# Patient Record
Sex: FEMALE | Race: BLACK OR AFRICAN AMERICAN | NOT HISPANIC OR LATINO | Employment: FULL TIME | URBAN - METROPOLITAN AREA
[De-identification: names, ages, dates, MRNs, and addresses within clinical notes are randomized per-mention and may not be internally consistent; named-entity substitution may affect disease eponyms.]

---

## 2018-05-15 ENCOUNTER — TELEPHONE (OUTPATIENT)
Dept: INTERNAL MEDICINE | Facility: CLINIC | Age: 48
End: 2018-05-15

## 2018-05-16 ENCOUNTER — HOSPITAL ENCOUNTER (OUTPATIENT)
Dept: GENERAL RADIOLOGY | Facility: HOSPITAL | Age: 48
Discharge: HOME | End: 2018-05-16
Admitting: FAMILY MEDICINE

## 2018-05-16 ENCOUNTER — OFFICE VISIT (OUTPATIENT)
Dept: INTERNAL MEDICINE | Facility: CLINIC | Age: 48
End: 2018-05-16

## 2018-05-16 VITALS
TEMPERATURE: 98.3 F | SYSTOLIC BLOOD PRESSURE: 124 MMHG | HEIGHT: 70 IN | DIASTOLIC BLOOD PRESSURE: 82 MMHG | OXYGEN SATURATION: 98 % | HEART RATE: 73 BPM

## 2018-05-16 DIAGNOSIS — M25.531 WRIST PAIN, ACUTE, RIGHT: ICD-10-CM
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
## 2018-05-16 DIAGNOSIS — K59.00 CONSTIPATION, UNSPECIFIED CONSTIPATION TYPE: ICD-10-CM

## 2018-05-16 DIAGNOSIS — I63.9 CEREBROVASCULAR ACCIDENT (CVA), UNSPECIFIED MECHANISM (HCC): Primary | ICD-10-CM

## 2018-05-16 PROCEDURE — 99204 OFFICE O/P NEW MOD 45 MIN: CPT | Performed by: FAMILY MEDICINE

## 2018-05-16 PROCEDURE — 73110 X-RAY EXAM OF WRIST: CPT

## 2018-05-16 RX ORDER — ASPIRIN 81 MG/1
81 TABLET ORAL DAILY
COMMUNITY
End: 2018-06-13 | Stop reason: SDUPTHER

## 2018-05-16 RX ORDER — CASTOR OIL AND BALSAM, PERU 788; 87 MG/G; MG/G
OINTMENT TOPICAL 2 TIMES DAILY
COMMUNITY
End: 2019-11-22

## 2018-05-16 RX ORDER — SODIUM BICARBONATE 650 MG/1
650 TABLET ORAL 3 TIMES DAILY
COMMUNITY
End: 2018-06-13 | Stop reason: SDUPTHER

## 2018-05-16 RX ORDER — TERAZOSIN 5 MG/1
5 CAPSULE ORAL NIGHTLY
COMMUNITY
End: 2018-06-13 | Stop reason: SDUPTHER

## 2018-05-16 RX ORDER — ATORVASTATIN CALCIUM 40 MG/1
40 TABLET, FILM COATED ORAL DAILY
COMMUNITY
End: 2018-06-13 | Stop reason: SDUPTHER

## 2018-05-16 RX ORDER — CALCITRIOL 0.25 UG/1
0.25 CAPSULE, LIQUID FILLED ORAL EVERY OTHER DAY
COMMUNITY
End: 2018-06-13 | Stop reason: SDUPTHER

## 2018-05-16 RX ORDER — NIFEDIPINE 90 MG/1
90 TABLET, EXTENDED RELEASE ORAL DAILY
COMMUNITY
End: 2018-06-13 | Stop reason: SDUPTHER

## 2018-05-16 RX ORDER — FLUOXETINE HYDROCHLORIDE 20 MG/1
20 CAPSULE ORAL DAILY
COMMUNITY
End: 2018-06-13 | Stop reason: SDUPTHER

## 2018-05-16 RX ORDER — ERGOCALCIFEROL 1.25 MG/1
50000 CAPSULE ORAL WEEKLY
COMMUNITY
End: 2018-06-13 | Stop reason: SDUPTHER

## 2018-05-16 RX ORDER — ISOSORBIDE DINITRATE 10 MG/1
10 TABLET ORAL 3 TIMES DAILY
COMMUNITY
End: 2018-06-13 | Stop reason: SDUPTHER

## 2018-05-16 RX ORDER — LABETALOL 200 MG/1
200 TABLET, FILM COATED ORAL 2 TIMES DAILY
COMMUNITY
End: 2018-06-13 | Stop reason: SDUPTHER

## 2018-06-05 ENCOUNTER — TELEPHONE (OUTPATIENT)
Dept: INTERNAL MEDICINE | Facility: CLINIC | Age: 48
End: 2018-06-05

## 2018-06-05 DIAGNOSIS — I10 HYPERTENSION, UNSPECIFIED TYPE: ICD-10-CM

## 2018-06-05 DIAGNOSIS — I63.9 CEREBROVASCULAR ACCIDENT (CVA), UNSPECIFIED MECHANISM (HCC): Primary | ICD-10-CM

## 2018-06-05 NOTE — TELEPHONE ENCOUNTER
Referrals needed:      1.[facility] for SN to discharge the patient, she's been having BP issues.  2.  [facility] for Speech, OT and PT.  Fax [phone] ATTN:  Jessica

## 2018-06-08 PROBLEM — I10 HYPERTENSION: Status: ACTIVE | Noted: 2018-06-08

## 2018-06-11 NOTE — PROGRESS NOTES
Subjective   Cindy Moreno is a 47 y.o. female.     Chief Complaint   Patient presents with   • Establish Care   • Vomiting     SX 2 days       History of Present Illness     Previous primary care: None    47-year-old female with a complex recent past medical history presents today to establish care.  She was, as of 6 months ago, previously healthy as far as she knew.  She sustained a CVA in the parking lot of [facility] with a protracted hospital course and stay at [facility].  We are in the process of obtaining records.  She presents today to establish care.    She also presents today with symptoms of vomiting for the past couple of days.  She has not been on her usual bowel regimen as her grandmother who is her caregiver here in [place] was unsure whether to have this.  It is nonprojectile and nonbilious.  It does not appear to be worsening over the past couple of days.    Her grandmother also remarks on a fall that she sustained a couple of days ago.  She reports swelling in her hand.  There is no numbness or tingling, she does have some weakness in that hand already from the stroke, but it does seem a little worse than usual following the fall.    #1 hypertension: Stable on nifedipine XL, labetalol 200 mg, Isordil 10 mg  #2 history of stroke: Stable on aspirin 81 mg, atorvastatin 40 mg  #3 depression: Stable on fluoxetine 20 mg    The following portions of the patient's history were reviewed and updated as appropriate: allergies, current medications, past family history, past medical history, past social history, past surgical history and problem list.    Active Ambulatory Problems     Diagnosis Date Noted   • Stroke 05/16/2018   • Hypertension 06/08/2018     Resolved Ambulatory Problems     Diagnosis Date Noted   • No Resolved Ambulatory Problems     Past Medical History:   Diagnosis Date   • Hyperlipidemia    • Hypertension    • Stroke      Past Surgical History:   Procedure Laterality Date  "  • TONSILLECTOMY       Family History   Problem Relation Age of Onset   • No Known Problems Mother    • Heart attack Father    • No Known Problems Sister    • Heart disease Brother    • No Known Problems Daughter    • No Known Problems Son      Social History     Social History   • Marital status: Unknown     Spouse name: N/A   • Number of children: N/A   • Years of education: N/A     Occupational History   • Not on file.     Social History Main Topics   • Smoking status: Never Smoker   • Smokeless tobacco: Never Used   • Alcohol use Yes      Comment: occasional   • Drug use: No   • Sexual activity: Not on file     Other Topics Concern   • Not on file     Social History Narrative   • No narrative on file         Review of Systems   Eyes: Negative.    Respiratory: Negative for cough, shortness of breath and wheezing.    Cardiovascular: Negative for chest pain and palpitations.   Gastrointestinal: Negative.    Endocrine: Negative for cold intolerance and heat intolerance.   Genitourinary: Negative for difficulty urinating, dysuria and frequency.   Musculoskeletal: Positive for joint swelling. Negative for neck stiffness.        Hand pain   Skin: Negative for color change and wound.   Neurological: Positive for speech difficulty, weakness and headaches. Negative for dizziness, tremors and seizures.   Psychiatric/Behavioral: Positive for confusion, dysphoric mood and sleep disturbance. Negative for agitation.       Objective   Blood pressure 124/82, pulse 73, temperature 98.3 °F (36.8 °C), temperature source Temporal Artery , height 177.8 cm (70\"), SpO2 98 %, not currently breastfeeding.  Nursing note reviewed  Physical Exam  Const: NAD, A&Ox4, Pleasant, Cooperative  Eyes: EOMI, no conjunctivitis  ENT: No nasal discharge present, neck supple  Cardiac: Regular rate and rhythm, no peripheral edema or cyanosis  Resp: Respiratory rate within normal limits, no increased work of breathing, no audible wheezing or retractions " noted  GI: No distention or ascites  MSK: Strength is 4/5 in the right hand.  This is slightly diminished from the 4+/5 otherwise in the right upper extremity.  There is swelling on the dorsum of the right hand.  Neurologic, CN II-XII grossly intact  Psych: Appropriate mood and behavior.  Skin: Pink, warm, dry  Procedures  Assessment/Plan   Cindy was seen today for establish care and vomiting.    Diagnoses and all orders for this visit:    Cerebrovascular accident (CVA), unspecified mechanism    Fall, initial encounter  -     XR Wrist 3+ View Right    Wrist pain, acute, right  -     XR Wrist 3+ View Right    Constipation, unspecified constipation type  -     docusate sodium (COLACE) 50 MG capsule; Take 1 capsule by mouth 2 (Two) Times a Day As Needed for Constipation.    Other orders  -     Cancel: Ambulatory Referral to Home Health      #1 right wrist pain secondary to fall  We will get an x-ray of the right wrist today.  She may have Tylenol for pain.  She was encouraged to ice the area throughout the day.    #2 constipation  We'll start her on docusate 50 mg capsule twice a day.    #3 history of CVA, weakness  She has orders for home health in from [facility].  This was confirmed personally over the phone with her  at [facility]    #4 hypertension  Stable on her medications today, blood pressure 124/82  Would like to have home health check this at least once a week    We will plan to obtain previous records both for chronic preventative care as well as those related to the current episode of care.  Any records that the patient brought with him today were reviewed personally by me during the visit today and will be scanned into the chart for posterity.    There are no Patient Instructions on file for this visit.    Current Outpatient Prescriptions:   •  aspirin 81 MG EC tablet, Take 81 mg by mouth Daily., Disp: , Rfl:   •  atorvastatin (LIPITOR) 40 MG tablet, Take 40 mg by mouth  Daily., Disp: , Rfl:   •  calcitriol (ROCALTROL) 0.25 MCG capsule, Take 0.25 mcg by mouth Every Other Day., Disp: , Rfl:   •  castor oil-balsam peru (VENELEX) ointment, Apply  topically 2 (Two) Times a Day., Disp: , Rfl:   •  FLUoxetine (PROzac) 20 MG capsule, Take 20 mg by mouth Daily., Disp: , Rfl:   •  isosorbide dinitrate (ISORDIL) 10 MG tablet, Take 10 mg by mouth 3 (Three) Times a Day., Disp: , Rfl:   •  labetalol (NORMODYNE) 200 MG tablet, Take 200 mg by mouth 2 (Two) Times a Day., Disp: , Rfl:   •  NIFEdipine XL (PROCARDIA XL) 90 MG 24 hr tablet, Take 90 mg by mouth Daily., Disp: , Rfl:   •  Patiromer Sorbitex Calcium 8.4 g pack, Take  by mouth., Disp: , Rfl:   •  sodium bicarbonate 650 MG tablet, Take 650 mg by mouth 3 (Three) Times a Day., Disp: , Rfl:   •  terazosin (HYTRIN) 5 MG capsule, Take 5 mg by mouth Every Night., Disp: , Rfl:   •  vitamin D (ERGOCALCIFEROL) 42490 units capsule capsule, Take 50,000 Units by mouth 1 (One) Time Per Week., Disp: , Rfl:   •  docusate sodium (COLACE) 50 MG capsule, Take 1 capsule by mouth 2 (Two) Times a Day As Needed for Constipation., Disp: 60 capsule, Rfl: 2    No Known Allergies      There is no immunization history on file for this patient.    Ambulatory progress note signed and attested to by To Bennett D.O.

## 2018-06-13 ENCOUNTER — OFFICE VISIT (OUTPATIENT)
Dept: INTERNAL MEDICINE | Facility: CLINIC | Age: 48
End: 2018-06-13

## 2018-06-13 VITALS
SYSTOLIC BLOOD PRESSURE: 124 MMHG | OXYGEN SATURATION: 98 % | WEIGHT: 146 LBS | DIASTOLIC BLOOD PRESSURE: 84 MMHG | HEART RATE: 71 BPM | HEIGHT: 70 IN | TEMPERATURE: 97.8 F | BODY MASS INDEX: 20.9 KG/M2

## 2018-06-13 DIAGNOSIS — I10 HYPERTENSION, UNSPECIFIED TYPE: Primary | ICD-10-CM

## 2018-06-13 DIAGNOSIS — H10.32 ACUTE CONJUNCTIVITIS OF LEFT EYE, UNSPECIFIED ACUTE CONJUNCTIVITIS TYPE: ICD-10-CM

## 2018-06-13 DIAGNOSIS — K59.00 CONSTIPATION, UNSPECIFIED CONSTIPATION TYPE: ICD-10-CM

## 2018-06-13 PROCEDURE — 99213 OFFICE O/P EST LOW 20 MIN: CPT | Performed by: FAMILY MEDICINE

## 2018-06-13 RX ORDER — NIFEDIPINE 90 MG/1
90 TABLET, EXTENDED RELEASE ORAL DAILY
Qty: 30 TABLET | Refills: 0 | Status: SHIPPED | OUTPATIENT
Start: 2018-06-13 | End: 2018-07-03 | Stop reason: SDUPTHER

## 2018-06-13 RX ORDER — CIPROFLOXACIN HYDROCHLORIDE 3.5 MG/ML
1 SOLUTION/ DROPS TOPICAL
Qty: 5 ML | Refills: 0 | Status: SHIPPED | OUTPATIENT
Start: 2018-06-13 | End: 2019-11-22

## 2018-06-13 RX ORDER — SODIUM BICARBONATE 650 MG/1
650 TABLET ORAL 3 TIMES DAILY
Qty: 90 TABLET | Refills: 0 | Status: SHIPPED | OUTPATIENT
Start: 2018-06-13 | End: 2018-07-09 | Stop reason: SDUPTHER

## 2018-06-13 RX ORDER — TERAZOSIN 5 MG/1
5 CAPSULE ORAL NIGHTLY
Qty: 30 CAPSULE | Refills: 0 | Status: SHIPPED | OUTPATIENT
Start: 2018-06-13 | End: 2018-07-03 | Stop reason: SDUPTHER

## 2018-06-13 RX ORDER — ASPIRIN 81 MG/1
81 TABLET ORAL DAILY
Qty: 30 TABLET | Refills: 0 | Status: SHIPPED | OUTPATIENT
Start: 2018-06-13 | End: 2018-07-03 | Stop reason: SDUPTHER

## 2018-06-13 RX ORDER — ERGOCALCIFEROL 1.25 MG/1
50000 CAPSULE ORAL WEEKLY
Qty: 30 CAPSULE | Refills: 0 | Status: SHIPPED | OUTPATIENT
Start: 2018-06-13 | End: 2018-07-09

## 2018-06-13 RX ORDER — LABETALOL 200 MG/1
200 TABLET, FILM COATED ORAL 2 TIMES DAILY
Qty: 60 TABLET | Refills: 0 | Status: SHIPPED | OUTPATIENT
Start: 2018-06-13 | End: 2018-07-03 | Stop reason: SDUPTHER

## 2018-06-13 RX ORDER — CALCITRIOL 0.25 UG/1
0.25 CAPSULE, LIQUID FILLED ORAL EVERY OTHER DAY
Qty: 15 CAPSULE | Refills: 0 | Status: SHIPPED | OUTPATIENT
Start: 2018-06-13 | End: 2018-07-09 | Stop reason: SDUPTHER

## 2018-06-13 RX ORDER — ISOSORBIDE DINITRATE 10 MG/1
10 TABLET ORAL 3 TIMES DAILY
Qty: 90 TABLET | Refills: 0 | Status: SHIPPED | OUTPATIENT
Start: 2018-06-13 | End: 2018-07-03 | Stop reason: SDUPTHER

## 2018-06-13 RX ORDER — FLUOXETINE HYDROCHLORIDE 20 MG/1
20 CAPSULE ORAL DAILY
Qty: 30 CAPSULE | Refills: 0 | Status: SHIPPED | OUTPATIENT
Start: 2018-06-13 | End: 2018-07-09 | Stop reason: SDUPTHER

## 2018-06-13 RX ORDER — ATORVASTATIN CALCIUM 40 MG/1
40 TABLET, FILM COATED ORAL DAILY
Qty: 30 TABLET | Refills: 0 | Status: SHIPPED | OUTPATIENT
Start: 2018-06-13 | End: 2018-07-03 | Stop reason: SDUPTHER

## 2018-06-13 NOTE — PATIENT INSTRUCTIONS
I talked with Odilia at [facility]. They will take over your care starting in the next couple of weeks.

## 2018-06-15 ENCOUNTER — TELEPHONE (OUTPATIENT)
Dept: INTERNAL MEDICINE | Facility: CLINIC | Age: 48
End: 2018-06-15

## 2018-06-15 NOTE — TELEPHONE ENCOUNTER
Called and spoke to Geraldine, advised her all referrals were made for Marce because we were informed by our referral coordinator that [facility] no longer has a contract with pt's insurance, she advised she would contact her office and try to get it straightened out

## 2018-06-15 NOTE — TELEPHONE ENCOUNTER
Dr be patient :      gelacio called and was working with dr be to get patient set up - however they have just been notified that the pat has already been set up with out patient therapy - they need to speak to a nurse regarding this   Please call her at [phone]

## 2018-06-15 NOTE — TELEPHONE ENCOUNTER
Called Geraldine to discuss this patients therapy, received no answer and no voicemail to leave a message

## 2018-06-21 ENCOUNTER — TELEPHONE (OUTPATIENT)
Dept: INTERNAL MEDICINE | Facility: CLINIC | Age: 48
End: 2018-06-21

## 2018-06-21 NOTE — TELEPHONE ENCOUNTER
Donald patient-zhou has questions on the dosing of isosorbide and labetol. Patient was taking 3 tablets 3 times a day of isosorbide and labetol from previous doctor. Please call to clarify

## 2018-06-25 NOTE — PROGRESS NOTES
Subjective   Cindy Moreno is a 47 y.o. female.     Chief Complaint   Patient presents with   • Cerebrovascular Accident     follow up        History of Present Illness     Cindy Moreno presents today for Follow-up from her CVA.  In the meantime since her last visit we were able to get her set up with [facility] navigators will be taking care of her primary care going forward.  She reports she is doing well, her hand pain is improved, she is doing well with therapy.    The following portions of the patient's history were reviewed and updated as appropriate: allergies, current medications, past family history, past medical history, past social history, past surgical history and problem list.    Active Ambulatory Problems     Diagnosis Date Noted   • Stroke 05/16/2018   • Hypertension 06/08/2018     Resolved Ambulatory Problems     Diagnosis Date Noted   • No Resolved Ambulatory Problems     Past Medical History:   Diagnosis Date   • Hyperlipidemia    • Hypertension    • Stroke      Past Surgical History:   Procedure Laterality Date   • TONSILLECTOMY       Family History   Problem Relation Age of Onset   • No Known Problems Mother    • Heart attack Father    • No Known Problems Sister    • Heart disease Brother    • No Known Problems Daughter    • No Known Problems Son      Social History     Social History   • Marital status: Unknown     Spouse name: N/A   • Number of children: N/A   • Years of education: N/A     Occupational History   • Not on file.     Social History Main Topics   • Smoking status: Never Smoker   • Smokeless tobacco: Never Used   • Alcohol use Yes      Comment: occasional   • Drug use: No   • Sexual activity: Not on file     Other Topics Concern   • Not on file     Social History Narrative   • No narrative on file         Review of Systems   Neurological: Positive for speech difficulty. Negative for seizures and syncope.       Objective   Blood pressure 124/84, pulse 71, temperature 97.8 °F  "(36.6 °C), temperature source Temporal Artery , height 177.8 cm (70\"), weight 66.2 kg (146 lb), SpO2 98 %, not currently breastfeeding.  Nursing note reviewed  Physical Exam  Const: NAD, A&Ox4, Pleasant, Cooperative  Eyes: EOMI, no conjunctivitis  ENT: No nasal discharge present, neck supple  Cardiac: Regular rate and rhythm, no peripheral edema or cyanosis  Resp: Respiratory rate within normal limits, no increased work of breathing, no audible wheezing or retractions noted  GI: No distention or ascites  MSK: Strength is 4/5 in the right hand.  This is slightly diminished from the 4+/5 otherwise in the right upper extremity.  There is swelling on the dorsum of the right hand.  Neurologic, CN II-XII grossly intact  Psych: Appropriate mood and behavior.  Skin: Pink, warm, dry  Procedures  Assessment/Plan   Cindy was seen today for cerebrovascular accident.    Diagnoses and all orders for this visit:    Hypertension, unspecified type    Constipation, unspecified constipation type  -     docusate sodium (COLACE) 50 MG capsule; Take 1 capsule by mouth 2 (Two) Times a Day As Needed for Constipation.    Acute conjunctivitis of left eye, unspecified acute conjunctivitis type  -     ciprofloxacin (CILOXAN) 0.3 % ophthalmic solution; Administer 1 drop into the left eye 6 (Six) Times a Day.    Other orders  -     terazosin (HYTRIN) 5 MG capsule; Take 1 capsule by mouth Every Night for 30 days.  -     aspirin 81 MG EC tablet; Take 1 tablet by mouth Daily.  -     atorvastatin (LIPITOR) 40 MG tablet; Take 1 tablet by mouth Daily.  -     calcitriol (ROCALTROL) 0.25 MCG capsule; Take 1 capsule by mouth Every Other Day for 30 days.  -     FLUoxetine (PROzac) 20 MG capsule; Take 1 capsule by mouth Daily for 30 days.  -     isosorbide dinitrate (ISORDIL) 10 MG tablet; Take 1 tablet by mouth 3 (Three) Times a Day for 30 days.  -     labetalol (NORMODYNE) 200 MG tablet; Take 1 tablet by mouth 2 (Two) Times a Day for 30 days.  -     " NIFEdipine XL (PROCARDIA XL) 90 MG 24 hr tablet; Take 1 tablet by mouth Daily for 30 days.  -     sodium bicarbonate 650 MG tablet; Take 1 tablet by mouth 3 (Three) Times a Day for 30 days.  -     vitamin D (ERGOCALCIFEROL) 89902 units capsule capsule; Take 1 capsule by mouth 1 (One) Time Per Week.    Refills were given on all of her medications.  Her blood pressure is doing well, we will continue the isosorbide 30 mg 3 times a day as it was at her discharge.  She should also continue the Procardia XL and the labetalol.    She should use docusate 50 mg twice a day for constipation.    Patient Instructions   I talked with Odilia at [facility]. They will take over your care starting in the next couple of weeks.      Current Outpatient Prescriptions:   •  aspirin 81 MG EC tablet, Take 1 tablet by mouth Daily., Disp: 30 tablet, Rfl: 0  •  atorvastatin (LIPITOR) 40 MG tablet, Take 1 tablet by mouth Daily., Disp: 30 tablet, Rfl: 0  •  calcitriol (ROCALTROL) 0.25 MCG capsule, Take 1 capsule by mouth Every Other Day for 30 days., Disp: 15 capsule, Rfl: 0  •  castor oil-balsam peru (VENELEX) ointment, Apply  topically 2 (Two) Times a Day., Disp: , Rfl:   •  docusate sodium (COLACE) 50 MG capsule, Take 1 capsule by mouth 2 (Two) Times a Day As Needed for Constipation., Disp: 60 capsule, Rfl: 2  •  FLUoxetine (PROzac) 20 MG capsule, Take 1 capsule by mouth Daily for 30 days., Disp: 30 capsule, Rfl: 0  •  isosorbide dinitrate (ISORDIL) 10 MG tablet, Take 1 tablet by mouth 3 (Three) Times a Day for 30 days., Disp: 90 tablet, Rfl: 0  •  labetalol (NORMODYNE) 200 MG tablet, Take 1 tablet by mouth 2 (Two) Times a Day for 30 days., Disp: 60 tablet, Rfl: 0  •  NIFEdipine XL (PROCARDIA XL) 90 MG 24 hr tablet, Take 1 tablet by mouth Daily for 30 days., Disp: 30 tablet, Rfl: 0  •  Patiromer Sorbitex Calcium 8.4 g pack, Take  by mouth., Disp: , Rfl:   •  sodium bicarbonate 650 MG tablet, Take 1 tablet by mouth 3 (Three)  Times a Day for 30 days., Disp: 90 tablet, Rfl: 0  •  terazosin (HYTRIN) 5 MG capsule, Take 1 capsule by mouth Every Night for 30 days., Disp: 30 capsule, Rfl: 0  •  vitamin D (ERGOCALCIFEROL) 37773 units capsule capsule, Take 1 capsule by mouth 1 (One) Time Per Week., Disp: 30 capsule, Rfl: 0  •  ciprofloxacin (CILOXAN) 0.3 % ophthalmic solution, Administer 1 drop into the left eye 6 (Six) Times a Day., Disp: 5 mL, Rfl: 0    No Known Allergies      There is no immunization history on file for this patient.    Ambulatory progress note signed and attested to by To Bennett D.O.

## 2018-06-25 NOTE — TELEPHONE ENCOUNTER
Called pharmacy spoke to Neri, he advised that the prescriptions have already been filled and picked up by the patient, stated that the medicine was dispensed how it was written, he saw note where they called about dosing, he advised the dosing seemed really high and was only for 1 month, Per Dr Bennett he wants it filled just as it was written, Neri advised pt already has the medicines and all issues have been resolved

## 2018-06-26 ENCOUNTER — TELEPHONE (OUTPATIENT)
Dept: INTERNAL MEDICINE | Facility: CLINIC | Age: 48
End: 2018-06-26

## 2018-07-02 ENCOUNTER — TELEPHONE (OUTPATIENT)
Dept: INTERNAL MEDICINE | Facility: CLINIC | Age: 48
End: 2018-07-02

## 2018-07-02 NOTE — TELEPHONE ENCOUNTER
Called and spoke to grandmother Dea Martinez, she advised that she has been giving pt the medicine how it was on the bottle, she stated that the pt is in [facility] and that she cannot talk right now and will have to call us back

## 2018-07-03 ENCOUNTER — TELEPHONE (OUTPATIENT)
Dept: FAMILY MEDICINE CLINIC | Facility: CLINIC | Age: 48
End: 2018-07-03

## 2018-07-03 RX ORDER — ISOSORBIDE DINITRATE 10 MG/1
30 TABLET ORAL 3 TIMES DAILY
Qty: 180 TABLET | Refills: 2 | Status: SHIPPED | OUTPATIENT
Start: 2018-07-03 | End: 2018-07-22 | Stop reason: SDUPTHER

## 2018-07-03 RX ORDER — NIFEDIPINE 90 MG/1
90 TABLET, EXTENDED RELEASE ORAL DAILY
Qty: 30 TABLET | Refills: 2 | Status: SHIPPED | OUTPATIENT
Start: 2018-07-03 | End: 2018-08-24 | Stop reason: SDUPTHER

## 2018-07-03 RX ORDER — ASPIRIN 81 MG/1
81 TABLET ORAL DAILY
Qty: 30 TABLET | Refills: 2 | Status: SHIPPED | OUTPATIENT
Start: 2018-07-03 | End: 2019-05-08 | Stop reason: SDUPTHER

## 2018-07-03 RX ORDER — ATORVASTATIN CALCIUM 40 MG/1
40 TABLET, FILM COATED ORAL DAILY
Qty: 30 TABLET | Refills: 2 | Status: SHIPPED | OUTPATIENT
Start: 2018-07-03 | End: 2018-08-06 | Stop reason: SDUPTHER

## 2018-07-03 RX ORDER — TERAZOSIN 5 MG/1
5 CAPSULE ORAL NIGHTLY
Qty: 30 CAPSULE | Refills: 2 | Status: SHIPPED | OUTPATIENT
Start: 2018-07-03 | End: 2018-07-09 | Stop reason: SDUPTHER

## 2018-07-03 NOTE — TELEPHONE ENCOUNTER
Called Grandmother Dea [phone] to advise of medications being filled, received no answer, lmovm for pt to call us back

## 2018-07-03 NOTE — TELEPHONE ENCOUNTER
Per patients caregiver request in prior encounter, refilled pt meds for her    Aspirin 81 mg 1 po qd # 30 R 2  Atorvastatin 40 mg 1 po qd # 30 R 2  Isosorbide Dinitrate 30 mg  3 po tid # 180 R 2  Nifedipine XL 90 mg 1 po qd # 30 R 2  Terazosin 5 mg 1 po qhs # 30 R 2

## 2018-07-05 RX ORDER — LABETALOL 200 MG/1
TABLET, FILM COATED ORAL
Qty: 60 TABLET | Refills: 0 | Status: SHIPPED | OUTPATIENT
Start: 2018-07-05 | End: 2018-07-09 | Stop reason: SDUPTHER

## 2018-07-05 RX ORDER — ISOSORBIDE DINITRATE 10 MG/1
TABLET ORAL
Qty: 90 TABLET | Refills: 0 | OUTPATIENT
Start: 2018-07-05

## 2018-07-09 ENCOUNTER — TELEPHONE (OUTPATIENT)
Dept: INTERNAL MEDICINE | Facility: CLINIC | Age: 48
End: 2018-07-09

## 2018-07-09 ENCOUNTER — OFFICE VISIT (OUTPATIENT)
Dept: INTERNAL MEDICINE | Facility: CLINIC | Age: 48
End: 2018-07-09

## 2018-07-09 VITALS
OXYGEN SATURATION: 97 % | WEIGHT: 153.2 LBS | HEIGHT: 70 IN | SYSTOLIC BLOOD PRESSURE: 146 MMHG | DIASTOLIC BLOOD PRESSURE: 98 MMHG | TEMPERATURE: 98.9 F | BODY MASS INDEX: 21.93 KG/M2 | HEART RATE: 120 BPM

## 2018-07-09 DIAGNOSIS — I63.9 CEREBROVASCULAR ACCIDENT (CVA), UNSPECIFIED MECHANISM (HCC): ICD-10-CM

## 2018-07-09 DIAGNOSIS — E55.9 VITAMIN D DEFICIENCY: ICD-10-CM

## 2018-07-09 DIAGNOSIS — I10 HYPERTENSION, UNSPECIFIED TYPE: Primary | ICD-10-CM

## 2018-07-09 PROCEDURE — 99214 OFFICE O/P EST MOD 30 MIN: CPT | Performed by: FAMILY MEDICINE

## 2018-07-09 RX ORDER — SODIUM BICARBONATE 650 MG/1
650 TABLET ORAL 3 TIMES DAILY
Qty: 90 TABLET | Refills: 0 | Status: SHIPPED | OUTPATIENT
Start: 2018-07-09 | End: 2018-08-06 | Stop reason: SDUPTHER

## 2018-07-09 RX ORDER — CALCITRIOL 0.25 UG/1
0.25 CAPSULE, LIQUID FILLED ORAL EVERY OTHER DAY
Qty: 15 CAPSULE | Refills: 2 | Status: SHIPPED | OUTPATIENT
Start: 2018-07-09 | End: 2018-08-06 | Stop reason: SDUPTHER

## 2018-07-09 RX ORDER — DOCUSATE SODIUM 100 MG
CAPSULE ORAL
Refills: 2 | COMMUNITY
Start: 2018-06-13 | End: 2020-12-15 | Stop reason: SDUPTHER

## 2018-07-09 RX ORDER — ASPIRIN 81 MG
TABLET,CHEWABLE ORAL
Refills: 0 | COMMUNITY
Start: 2018-05-08 | End: 2019-01-07 | Stop reason: SDUPTHER

## 2018-07-09 RX ORDER — TERAZOSIN 5 MG/1
5 CAPSULE ORAL NIGHTLY
Qty: 30 CAPSULE | Refills: 2 | Status: SHIPPED | OUTPATIENT
Start: 2018-07-09 | End: 2018-10-08 | Stop reason: SDUPTHER

## 2018-07-09 RX ORDER — FLUOXETINE HYDROCHLORIDE 20 MG/1
20 CAPSULE ORAL DAILY
Qty: 30 CAPSULE | Refills: 2 | Status: SHIPPED | OUTPATIENT
Start: 2018-07-09 | End: 2018-08-06 | Stop reason: SDUPTHER

## 2018-07-09 RX ORDER — LABETALOL 200 MG/1
200 TABLET, FILM COATED ORAL EVERY 8 HOURS SCHEDULED
Qty: 90 TABLET | Refills: 0 | Status: SHIPPED | OUTPATIENT
Start: 2018-07-09 | End: 2018-08-08 | Stop reason: SDUPTHER

## 2018-07-09 NOTE — TELEPHONE ENCOUNTER
Patient is taking 3x a day of labetalol.  Was written for 1x daily.  [facility] at [facility]

## 2018-07-09 NOTE — PROGRESS NOTES
Subjective   Cindy Moreno is a 48 y.o. female.     Chief Complaint   Patient presents with   • Hypertension       History of Present Illness     Cindy Moreno presents today for Follow-up from her CVA And for her hypertension.  We had been able to get her set up with [facility], but per the patient's grandmother the patient would prefer to continue to follow here.    She is generally doing very well, she is up and walking much more than she had been previously.  She did have a fall which required a trip to the ER.  She will be following up with the orthopedist tomorrow.    At her last appointment she was having a little more fatigue and low heart rate, and her labetalol dose was reduced from 3 times daily to twice daily.  Her grandmother is noticed that her blood pressures have been creeping up higher, and there is some confusion between them, the pharmacy, and our office about correct dosage.  She remains asymptomatic, but does have elevated blood pressure.    The following portions of the patient's history were reviewed and updated as appropriate: allergies, current medications, past family history, past medical history, past social history, past surgical history and problem list.    Active Ambulatory Problems     Diagnosis Date Noted   • Stroke (CMS/HCC) 05/16/2018   • Hypertension 06/08/2018   • Vitamin D deficiency 07/09/2018     Resolved Ambulatory Problems     Diagnosis Date Noted   • No Resolved Ambulatory Problems     Past Medical History:   Diagnosis Date   • Hyperlipidemia    • Hypertension    • Stroke (CMS/HCC)      Past Surgical History:   Procedure Laterality Date   • TONSILLECTOMY       Family History   Problem Relation Age of Onset   • No Known Problems Mother    • Heart attack Father    • No Known Problems Sister    • Heart disease Brother    • No Known Problems Daughter    • No Known Problems Son      Social History     Social History   • Marital status: Unknown     Spouse name:  "N/A   • Number of children: N/A   • Years of education: N/A     Occupational History   • Not on file.     Social History Main Topics   • Smoking status: Never Smoker   • Smokeless tobacco: Never Used   • Alcohol use Yes      Comment: occasional   • Drug use: No   • Sexual activity: Not on file     Other Topics Concern   • Not on file     Social History Narrative   • No narrative on file         Review of Systems   Neurological: Positive for speech difficulty. Negative for seizures and syncope.       Objective   Blood pressure 146/98, pulse 120, temperature 98.9 °F (37.2 °C), temperature source Temporal Artery , height 177.8 cm (70\"), weight 69.5 kg (153 lb 3.2 oz), SpO2 97 %, not currently breastfeeding.  Nursing note reviewed  Physical Exam  Const: NAD, A&Ox4, Pleasant, Cooperative  Eyes: EOMI, no conjunctivitis  ENT: No nasal discharge present, neck supple  Cardiac: Regular rate and rhythm, no peripheral edema or cyanosis  Resp: Respiratory rate within normal limits, no increased work of breathing, no audible wheezing or retractions noted  GI: No distention or ascites  MSK: Strength is 4/5 in the right hand.  This is slightly diminished from the 4+/5 otherwise in the right upper extremity.  There is swelling on the dorsum of the right hand.  It is currently splinted  Neurologic, CN II-XII grossly intact.  Her speech is improved  Psych: Appropriate mood and behavior.  Skin: Pink, warm, dry  Procedures  Assessment/Plan   Cindy was seen today for hypertension.    Diagnoses and all orders for this visit:    Hypertension, unspecified type  -     labetalol (NORMODYNE) 200 MG tablet; Take 1 tablet by mouth Every 8 (Eight) Hours for 30 days.    Cerebrovascular accident (CVA), unspecified mechanism (CMS/HCC)  -     FLUoxetine (PROzac) 20 MG capsule; Take 1 capsule by mouth Daily for 30 days.  -     terazosin (HYTRIN) 5 MG capsule; Take 1 capsule by mouth Every Night.    Vitamin D deficiency  -     calcitriol (ROCALTROL) " 0.25 MCG capsule; Take 1 capsule by mouth Every Other Day for 30 days.    Other orders  -     sodium bicarbonate 650 MG tablet; Take 1 tablet by mouth 3 (Three) Times a Day for 30 days.    Refills were given on all of her medications.  Her blood pressure is doing well, we will continue the isosorbide 30 mg 3 times a day as it was at her discharge.  She should also continue the Procardia XL and the labetalol, , we will put the labetalol dose back to the original dosage as it was at discharge.    She should use docusate 50 mg twice a day for constipation as needed, this is significantly improved symptomatically.    We'll plan to see her back in about 6 weeks for follow-up on her blood pressure.    There are no Patient Instructions on file for this visit.    Current Outpatient Prescriptions:   •  aspirin 81 MG EC tablet, Take 1 tablet by mouth Daily., Disp: 30 tablet, Rfl: 2  •  atorvastatin (LIPITOR) 40 MG tablet, Take 1 tablet by mouth Daily., Disp: 30 tablet, Rfl: 2  •  calcitriol (ROCALTROL) 0.25 MCG capsule, Take 1 capsule by mouth Every Other Day for 30 days., Disp: 15 capsule, Rfl: 2  •  castor oil-balsam peru (VENELEX) ointment, Apply  topically 2 (Two) Times a Day., Disp: , Rfl:   •  ciprofloxacin (CILOXAN) 0.3 % ophthalmic solution, Administer 1 drop into the left eye 6 (Six) Times a Day., Disp: 5 mL, Rfl: 0  •  docusate sodium (COLACE) 50 MG capsule, Take 1 capsule by mouth 2 (Two) Times a Day As Needed for Constipation., Disp: 60 capsule, Rfl: 2  •  FLUoxetine (PROzac) 20 MG capsule, Take 1 capsule by mouth Daily for 30 days., Disp: 30 capsule, Rfl: 2  •  isosorbide dinitrate (ISORDIL) 10 MG tablet, Take 3 tablets by mouth 3 (Three) Times a Day., Disp: 180 tablet, Rfl: 2  •  labetalol (NORMODYNE) 200 MG tablet, Take 1 tablet by mouth Every 8 (Eight) Hours for 30 days., Disp: 90 tablet, Rfl: 0  •  NIFEdipine XL (PROCARDIA XL) 90 MG 24 hr tablet, Take 1 tablet by mouth Daily., Disp: 30 tablet, Rfl: 2  •   sodium bicarbonate 650 MG tablet, Take 1 tablet by mouth 3 (Three) Times a Day for 30 days., Disp: 90 tablet, Rfl: 0  •  STOOL SOFTENER 100 MG capsule, TK 1 C PO BID PRF CONSTIPATION, Disp: , Rfl: 2  •  terazosin (HYTRIN) 5 MG capsule, Take 1 capsule by mouth Every Night., Disp: 30 capsule, Rfl: 2  •  ASPIRIN LOW DOSE 81 MG chewable tablet, CHEW AND SWALLOW 1 T D, Disp: , Rfl: 0  •  Patiromer Sorbitex Calcium 8.4 g pack, Take  by mouth., Disp: , Rfl:     No Known Allergies      There is no immunization history on file for this patient.    Ambulatory progress note signed and attested to by To Bennett D.O.

## 2018-07-23 RX ORDER — ISOSORBIDE DINITRATE 10 MG/1
TABLET ORAL
Qty: 810 TABLET | Refills: 2 | Status: SHIPPED | OUTPATIENT
Start: 2018-07-23 | End: 2019-04-15 | Stop reason: SDUPTHER

## 2018-08-06 DIAGNOSIS — E55.9 VITAMIN D DEFICIENCY: ICD-10-CM

## 2018-08-06 DIAGNOSIS — I63.9 CEREBROVASCULAR ACCIDENT (CVA), UNSPECIFIED MECHANISM (HCC): ICD-10-CM

## 2018-08-06 RX ORDER — CALCITRIOL 0.25 UG/1
CAPSULE, LIQUID FILLED ORAL
Qty: 45 CAPSULE | Refills: 2 | Status: SHIPPED | OUTPATIENT
Start: 2018-08-06 | End: 2020-10-14 | Stop reason: SDUPTHER

## 2018-08-06 RX ORDER — FLUOXETINE HYDROCHLORIDE 20 MG/1
CAPSULE ORAL
Qty: 90 CAPSULE | Refills: 2 | Status: SHIPPED | OUTPATIENT
Start: 2018-08-06 | End: 2018-10-08 | Stop reason: SDUPTHER

## 2018-08-06 RX ORDER — ATORVASTATIN CALCIUM 40 MG/1
40 TABLET, FILM COATED ORAL DAILY
Qty: 90 TABLET | Refills: 2 | Status: SHIPPED | OUTPATIENT
Start: 2018-08-06 | End: 2018-10-08 | Stop reason: SDUPTHER

## 2018-08-06 RX ORDER — SODIUM BICARBONATE 650 MG/1
TABLET ORAL
Qty: 90 TABLET | Refills: 0 | Status: SHIPPED | OUTPATIENT
Start: 2018-08-06 | End: 2018-09-08 | Stop reason: SDUPTHER

## 2018-08-08 DIAGNOSIS — I10 HYPERTENSION, UNSPECIFIED TYPE: ICD-10-CM

## 2018-08-08 RX ORDER — LABETALOL 200 MG/1
TABLET, FILM COATED ORAL
Qty: 270 TABLET | Refills: 0 | Status: SHIPPED | OUTPATIENT
Start: 2018-08-08 | End: 2018-11-19 | Stop reason: SDUPTHER

## 2018-08-09 ENCOUNTER — OFFICE VISIT (OUTPATIENT)
Dept: INTERNAL MEDICINE | Facility: CLINIC | Age: 48
End: 2018-08-09

## 2018-08-09 VITALS
WEIGHT: 149.2 LBS | HEIGHT: 70 IN | OXYGEN SATURATION: 99 % | BODY MASS INDEX: 21.36 KG/M2 | HEART RATE: 58 BPM | SYSTOLIC BLOOD PRESSURE: 132 MMHG | TEMPERATURE: 98.1 F | DIASTOLIC BLOOD PRESSURE: 84 MMHG

## 2018-08-09 DIAGNOSIS — R53.83 FATIGUE, UNSPECIFIED TYPE: ICD-10-CM

## 2018-08-09 DIAGNOSIS — D50.9 MICROCYTIC ANEMIA: ICD-10-CM

## 2018-08-09 DIAGNOSIS — I10 HYPERTENSION, UNSPECIFIED TYPE: ICD-10-CM

## 2018-08-09 DIAGNOSIS — E78.5 HYPERLIPIDEMIA, UNSPECIFIED HYPERLIPIDEMIA TYPE: ICD-10-CM

## 2018-08-09 DIAGNOSIS — I63.9 CEREBROVASCULAR ACCIDENT (CVA), UNSPECIFIED MECHANISM (HCC): Primary | ICD-10-CM

## 2018-08-09 DIAGNOSIS — E55.9 VITAMIN D DEFICIENCY: ICD-10-CM

## 2018-08-09 PROCEDURE — 99214 OFFICE O/P EST MOD 30 MIN: CPT | Performed by: FAMILY MEDICINE

## 2018-08-09 NOTE — PATIENT INSTRUCTIONS
1. Blood work has been ordered for you today.  Have this done at the [facility].  You do not need an appointment.  If you are unable to have your labs done today, please return over the next few days to have them done.  The nurse will call you with results or they will be discussed at your next scheduled visit.    Your labs should be done when you're in a fasting state.  This means you should not have anything with calories for at least 10 hours prior to the blood draw.  This is mainly of importance when testing your fasting blood sugar or your cholesterol.  Both blood sugar and triglyceride levels rise after meals, and this may impact your results.    Examples of labs for which you do not need to fast are: Complete blood count (for anemia or infection), renal or liver function testing, hemoglobin A1c, thyroid testing, PSA, INR, and most vitamin levels.    2. Start taking a half-dose of the labetalol. Your new dose will be 100mg every 8 hours.

## 2018-08-20 NOTE — PROGRESS NOTES
Subjective   Cindy Moreno is a 48 y.o. female.     Chief Complaint   Patient presents with   • Hypertension       Hypertension          Cinyd Moreno presents today for Follow-up from her CVA And for her hypertension.  We had been able to get her set up with [facility] navigators, but per the patient's grandmother the patient would prefer to continue to follow here.     She is generally doing very well, she is up and walking much more than she had been previously.  She is still in a right wrist splint after a fall.    At a previous appointment she was having a little more fatigue and low heart rate, and her labetalol dose was reduced from 3 times daily to twice daily.  Her grandmother noticed that her blood pressures have been creeping up higher, and she resumed the 3 times daily dosing.  She has again been having more fatigue and low heart rate.  She denies any syncopal episodes or dizziness.  She has been urinating well, constipation has improved.    The following portions of the patient's history were reviewed and updated as appropriate: allergies, current medications, past family history, past medical history, past social history, past surgical history and problem list.    Active Ambulatory Problems     Diagnosis Date Noted   • Stroke (CMS/HCC) 05/16/2018   • Hypertension 06/08/2018   • Vitamin D deficiency 07/09/2018   • Hyperlipidemia 08/09/2018   • Microcytic anemia 08/09/2018     Resolved Ambulatory Problems     Diagnosis Date Noted   • No Resolved Ambulatory Problems     Past Medical History:   Diagnosis Date   • Hyperlipidemia    • Hypertension    • Stroke (CMS/HCC)      Past Surgical History:   Procedure Laterality Date   • TONSILLECTOMY       Family History   Problem Relation Age of Onset   • No Known Problems Mother    • Heart attack Father    • No Known Problems Sister    • Heart disease Brother    • No Known Problems Daughter    • No Known Problems Son      Social History     Social History   •  "Marital status: Unknown     Spouse name: N/A   • Number of children: N/A   • Years of education: N/A     Occupational History   • Not on file.     Social History Main Topics   • Smoking status: Never Smoker   • Smokeless tobacco: Never Used   • Alcohol use Yes      Comment: occasional   • Drug use: No   • Sexual activity: Not on file     Other Topics Concern   • Not on file     Social History Narrative   • No narrative on file         Review of Systems   Neurological: Positive for speech difficulty. Negative for seizures and syncope.       Objective   Blood pressure 132/84, pulse 58, temperature 98.1 °F (36.7 °C), temperature source Temporal Artery , height 177.8 cm (70\"), weight 67.7 kg (149 lb 3.2 oz), SpO2 99 %, not currently breastfeeding.  Nursing note reviewed  Physical Exam  Const: NAD, A&Ox4, Pleasant, Cooperative  Eyes: EOMI, no conjunctivitis  ENT: No nasal discharge present, neck supple  Cardiac: Regular rate and rhythm, no peripheral edema or cyanosis  Resp: Respiratory rate within normal limits, no increased work of breathing, no audible wheezing or retractions noted  GI: No distention or ascites  MSK: Strength is 4/5 in the right hand.  This is slightly diminished from the 4+/5 otherwise in the right upper extremity.  There is swelling on the dorsum of the right hand.  It is currently splinted  Neurologic, CN II-XII grossly intact.  Her speech is improved  Psych: Appropriate mood and behavior.  Skin: Pink, warm, dry  Procedures  Assessment/Plan   Cindy was seen today for hypertension.    Diagnoses and all orders for this visit:    Cerebrovascular accident (CVA), unspecified mechanism (CMS/[facility])  -     Lipid panel; Future  -     Comprehensive metabolic panel; Future  -     Microalbumin / Creatinine Urine Ratio - Urine, Clean Catch; Future  -     Hemoglobin A1c; Future    Hypertension, unspecified type  -     Lipid panel; Future  -     Comprehensive metabolic panel; Future  -     Microalbumin / " Creatinine Urine Ratio - Urine, Clean Catch; Future  -     Hemoglobin A1c; Future    Hyperlipidemia, unspecified hyperlipidemia type  -     Lipid panel; Future  -     Comprehensive metabolic panel; Future    Vitamin D deficiency  -     Vitamin D 25 hydroxy; Future    Fatigue, unspecified type  -     TSH; Future    Microcytic anemia  -     CBC No Differential; Future  -     Ferritin; Future    #1 history of CVA  · She should continue low-dose aspirin in addition to atorvastatin 40 mg.  · She has some persistent blurry vision, and should continue to follow with neuro-ophthalmology (appt 9/20)  · Blood pressure control will be paramount to her health and recovery.  She will continue isosorbide, labetalol, and nifedipine XL 90 mg.  · She should continue outpatient PT at [facility], and follow-up with Dr. Cowart in PM&R    #2 hypertension  · Continue isosorbide 30 mg 3 times daily, nifedipine XL 90 mg daily.  She is having some increased fatigue and bradycardia with the labetalol 200 mg 3 times daily.  I have asked her to reduce this to 100 mg 3 times daily for now.  · Goal is less than 120/80.  · She should continue to follow-up with [facility]    #3 chronic kidney disease secondary to uncontrolled long-standing hypertension  · She should continue to follow-up with [facility]    #4 iron deficiency anemia  · Also with some contribution from chronic kidney disease  · She should continue to follow with [facility] for iron infusions, last on 6/13    #5 vitamin D deficiency  · Will defer to [facility] with her chronic kidney disease    #6 depression  · Related to above declining health  · She should continue fluoxetine 20 mg.  We may consider increasing this in the future.    #7 fatigue  · Related to above declining health, as well as depression.  We will recheck a TSH today, I also recommend she decrease her labetalol dose as above.    #8 right wrist fracture  · Continue to follow with orthopedics.  Continue  splinting.    Patient Instructions   1. Blood work has been ordered for you today.  Have this done at the [facility].  You do not need an appointment.  If you are unable to have your labs done today, please return over the next few days to have them done.  The nurse will call you with results or they will be discussed at your next scheduled visit.    Your labs should be done when you're in a fasting state.  This means you should not have anything with calories for at least 10 hours prior to the blood draw.  This is mainly of importance when testing your fasting blood sugar or your cholesterol.  Both blood sugar and triglyceride levels rise after meals, and this may impact your results.    Examples of labs for which you do not need to fast are: Complete blood count (for anemia or infection), renal or liver function testing, hemoglobin A1c, thyroid testing, PSA, INR, and most vitamin levels.    2. Start taking a half-dose of the labetalol. Your new dose will be 100mg every 8 hours.        Current Outpatient Prescriptions:   •  aspirin 81 MG EC tablet, Take 1 tablet by mouth Daily., Disp: 30 tablet, Rfl: 2  •  ASPIRIN LOW DOSE 81 MG chewable tablet, CHEW AND SWALLOW 1 T D, Disp: , Rfl: 0  •  atorvastatin (LIPITOR) 40 MG tablet, TAKE 1 TABLET BY MOUTH DAILY, Disp: 90 tablet, Rfl: 2  •  calcitriol (ROCALTROL) 0.25 MCG capsule, TAKE 1 CAPSULE BY MOUTH EVERY OTHER DAY, Disp: 45 capsule, Rfl: 2  •  castor oil-balsam peru (VENELEX) ointment, Apply  topically 2 (Two) Times a Day., Disp: , Rfl:   •  ciprofloxacin (CILOXAN) 0.3 % ophthalmic solution, Administer 1 drop into the left eye 6 (Six) Times a Day., Disp: 5 mL, Rfl: 0  •  docusate sodium (COLACE) 50 MG capsule, Take 1 capsule by mouth 2 (Two) Times a Day As Needed for Constipation., Disp: 60 capsule, Rfl: 2  •  FLUoxetine (PROzac) 20 MG capsule, TAKE 1 CAPSULE BY MOUTH DAILY, Disp: 90 capsule, Rfl: 2  •  isosorbide dinitrate (ISORDIL) 10 MG tablet, TAKE  3 TABLETS BY MOUTH THREE TIMES DAILY, Disp: 810 tablet, Rfl: 2  •  labetalol (NORMODYNE) 200 MG tablet, TAKE 1 TABLET BY MOUTH EVERY 8 HOURS, Disp: 270 tablet, Rfl: 0  •  NIFEdipine XL (PROCARDIA XL) 90 MG 24 hr tablet, Take 1 tablet by mouth Daily., Disp: 30 tablet, Rfl: 2  •  Patiromer Sorbitex Calcium 8.4 g pack, Take  by mouth., Disp: , Rfl:   •  sodium bicarbonate 650 MG tablet, TAKE 1 TABLET BY MOUTH THREE TIMES DAILY, Disp: 90 tablet, Rfl: 0  •  STOOL SOFTENER 100 MG capsule, TK 1 C PO BID PRF CONSTIPATION, Disp: , Rfl: 2  •  terazosin (HYTRIN) 5 MG capsule, Take 1 capsule by mouth Every Night., Disp: 30 capsule, Rfl: 2    No Known Allergies      There is no immunization history on file for this patient.    Ambulatory progress note signed and attested to by To Bennett D.O.

## 2018-08-24 RX ORDER — NIFEDIPINE 90 MG/1
90 TABLET, EXTENDED RELEASE ORAL DAILY
Qty: 90 TABLET | Refills: 2 | Status: SHIPPED | OUTPATIENT
Start: 2018-08-24 | End: 2018-10-08 | Stop reason: SDUPTHER

## 2018-08-31 ENCOUNTER — LAB (OUTPATIENT)
Dept: LAB | Facility: HOSPITAL | Age: 48
End: 2018-08-31

## 2018-08-31 DIAGNOSIS — R53.83 FATIGUE, UNSPECIFIED TYPE: ICD-10-CM

## 2018-08-31 DIAGNOSIS — I63.9 CEREBROVASCULAR ACCIDENT (CVA), UNSPECIFIED MECHANISM (HCC): ICD-10-CM

## 2018-08-31 DIAGNOSIS — E78.5 HYPERLIPIDEMIA, UNSPECIFIED HYPERLIPIDEMIA TYPE: ICD-10-CM

## 2018-08-31 DIAGNOSIS — D50.9 MICROCYTIC ANEMIA: ICD-10-CM

## 2018-08-31 DIAGNOSIS — I10 HYPERTENSION, UNSPECIFIED TYPE: ICD-10-CM

## 2018-08-31 DIAGNOSIS — E55.9 VITAMIN D DEFICIENCY: ICD-10-CM

## 2018-08-31 LAB
25(OH)D3 SERPL-MCNC: 65.6 NG/ML
ALBUMIN SERPL-MCNC: 4.88 G/DL (ref 3.2–4.8)
ALBUMIN/GLOB SERPL: 1.9 G/DL (ref 1.5–2.5)
ALP SERPL-CCNC: 60 U/L (ref 25–100)
ALT SERPL W P-5'-P-CCNC: 16 U/L (ref 7–40)
ANION GAP SERPL CALCULATED.3IONS-SCNC: 8 MMOL/L (ref 3–11)
ARTICHOKE IGE QN: 72 MG/DL (ref 0–130)
AST SERPL-CCNC: 23 U/L (ref 0–33)
BILIRUB SERPL-MCNC: 0.6 MG/DL (ref 0.3–1.2)
BUN BLD-MCNC: 33 MG/DL (ref 9–23)
BUN/CREAT SERPL: 8.3 (ref 7–25)
CALCIUM SPEC-SCNC: 10.1 MG/DL (ref 8.7–10.4)
CHLORIDE SERPL-SCNC: 102 MMOL/L (ref 99–109)
CHOLEST SERPL-MCNC: 184 MG/DL (ref 0–200)
CO2 SERPL-SCNC: 28 MMOL/L (ref 20–31)
CREAT BLD-MCNC: 3.99 MG/DL (ref 0.6–1.3)
DEPRECATED RDW RBC AUTO: 45.5 FL (ref 37–54)
ERYTHROCYTE [DISTWIDTH] IN BLOOD BY AUTOMATED COUNT: 13.7 % (ref 11.3–14.5)
FERRITIN SERPL-MCNC: 115 NG/ML (ref 10–291)
GFR SERPL CREATININE-BSD FRML MDRD: 14 ML/MIN/1.73
GLOBULIN UR ELPH-MCNC: 2.6 GM/DL
GLUCOSE BLD-MCNC: 92 MG/DL (ref 70–100)
HBA1C MFR BLD: 5.3 % (ref 4.8–5.6)
HCT VFR BLD AUTO: 35.7 % (ref 34.5–44)
HDLC SERPL-MCNC: 87 MG/DL (ref 40–60)
HGB BLD-MCNC: 11.7 G/DL (ref 11.5–15.5)
MCH RBC QN AUTO: 30 PG (ref 27–31)
MCHC RBC AUTO-ENTMCNC: 32.8 G/DL (ref 32–36)
MCV RBC AUTO: 91.5 FL (ref 80–99)
PLATELET # BLD AUTO: 241 10*3/MM3 (ref 150–450)
PMV BLD AUTO: 9.2 FL (ref 6–12)
POTASSIUM BLD-SCNC: 4.1 MMOL/L (ref 3.5–5.5)
PROT SERPL-MCNC: 7.5 G/DL (ref 5.7–8.2)
RBC # BLD AUTO: 3.9 10*6/MM3 (ref 3.89–5.14)
SODIUM BLD-SCNC: 138 MMOL/L (ref 132–146)
TRIGL SERPL-MCNC: 74 MG/DL (ref 0–150)
TSH SERPL DL<=0.05 MIU/L-ACNC: 2.85 MIU/ML (ref 0.35–5.35)
WBC NRBC COR # BLD: 4.25 10*3/MM3 (ref 3.5–10.8)

## 2018-08-31 PROCEDURE — 82043 UR ALBUMIN QUANTITATIVE: CPT

## 2018-08-31 PROCEDURE — 85027 COMPLETE CBC AUTOMATED: CPT

## 2018-08-31 PROCEDURE — 36415 COLL VENOUS BLD VENIPUNCTURE: CPT

## 2018-08-31 PROCEDURE — 80061 LIPID PANEL: CPT

## 2018-08-31 PROCEDURE — 80053 COMPREHEN METABOLIC PANEL: CPT

## 2018-08-31 PROCEDURE — 84443 ASSAY THYROID STIM HORMONE: CPT

## 2018-08-31 PROCEDURE — 82728 ASSAY OF FERRITIN: CPT

## 2018-08-31 PROCEDURE — 83036 HEMOGLOBIN GLYCOSYLATED A1C: CPT

## 2018-08-31 PROCEDURE — 82570 ASSAY OF URINE CREATININE: CPT

## 2018-08-31 PROCEDURE — 82306 VITAMIN D 25 HYDROXY: CPT

## 2018-09-01 LAB
CREAT 24H UR-MCNC: 198.2 MG/DL
MICROALBUMIN UR-MCNC: 730.9 UG/ML
MICROALBUMIN/CREAT UR: 368.8 MG/G CREAT (ref 0–30)

## 2018-09-06 ENCOUNTER — OFFICE VISIT (OUTPATIENT)
Dept: FAMILY MEDICINE CLINIC | Facility: CLINIC | Age: 48
End: 2018-09-06

## 2018-09-06 ENCOUNTER — LAB (OUTPATIENT)
Dept: LAB | Facility: HOSPITAL | Age: 48
End: 2018-09-06

## 2018-09-06 VITALS
SYSTOLIC BLOOD PRESSURE: 172 MMHG | BODY MASS INDEX: 21.42 KG/M2 | DIASTOLIC BLOOD PRESSURE: 98 MMHG | TEMPERATURE: 97.6 F | HEIGHT: 70 IN | HEART RATE: 70 BPM | WEIGHT: 149.6 LBS | OXYGEN SATURATION: 94 %

## 2018-09-06 DIAGNOSIS — I10 HYPERTENSION, UNSPECIFIED TYPE: ICD-10-CM

## 2018-09-06 DIAGNOSIS — N18.5 CHRONIC RENAL FAILURE, STAGE 5 (HCC): ICD-10-CM

## 2018-09-06 DIAGNOSIS — I10 HYPERTENSION, UNSPECIFIED TYPE: Primary | ICD-10-CM

## 2018-09-06 LAB
ALBUMIN SERPL-MCNC: 4.92 G/DL (ref 3.2–4.8)
ANION GAP SERPL CALCULATED.3IONS-SCNC: 8 MMOL/L (ref 3–11)
BUN BLD-MCNC: 32 MG/DL (ref 9–23)
BUN/CREAT SERPL: 8.6 (ref 7–25)
CALCIUM SPEC-SCNC: 10 MG/DL (ref 8.7–10.4)
CHLORIDE SERPL-SCNC: 103 MMOL/L (ref 99–109)
CO2 SERPL-SCNC: 29 MMOL/L (ref 20–31)
CREAT BLD-MCNC: 3.73 MG/DL (ref 0.6–1.3)
GFR SERPL CREATININE-BSD FRML MDRD: 16 ML/MIN/1.73
GLUCOSE BLD-MCNC: 102 MG/DL (ref 70–100)
PHOSPHATE SERPL-MCNC: 4.8 MG/DL (ref 2.4–5.1)
POTASSIUM BLD-SCNC: 4.2 MMOL/L (ref 3.5–5.5)
SODIUM BLD-SCNC: 140 MMOL/L (ref 132–146)

## 2018-09-06 PROCEDURE — 99214 OFFICE O/P EST MOD 30 MIN: CPT | Performed by: FAMILY MEDICINE

## 2018-09-06 PROCEDURE — 80069 RENAL FUNCTION PANEL: CPT

## 2018-09-06 PROCEDURE — 36415 COLL VENOUS BLD VENIPUNCTURE: CPT

## 2018-09-06 NOTE — PATIENT INSTRUCTIONS
1. NEED to follow up with [facility]  · 10/3 4:00pm Dr Bernard at [facility]    2. Follow up with Dr. Cowart at [facility]  · 10/15 3:45pm at [facility] [address] on the 1st floor by [facility]    3. Start taking full dose of labetalol again.

## 2018-09-06 NOTE — PROGRESS NOTES
Subjective   Cindy Moreno is a 48 y.o. female.     Chief Complaint   Patient presents with   • Hypertension     since changed to half a pill BP has been running higher       Hypertension          Cindy Moreno presents today for Follow-up from her CVA And for her hypertension.    She is generally doing very well, she is up and walking much more than she had been previously.  She was previously in a wrist splint following a fall, but has been able to come out of this.    At a previous appointment she was having a little more fatigue and low heart rate, and her labetalol dose was reduced from 3 times daily half a pill 3 times daily.  Her grandmother noticed that her blood pressures have been creeping up higher, and she resumed the 3 times daily dosing.  She has again been having more fatigue and low heart rate.  She denies any syncopal episodes or dizziness.  She has been urinating well, constipation has improved.    She has stage V chronic kidney disease with a recent creatinine 3.99.  She needs to follow-up with her nephrologist.    The following portions of the patient's history were reviewed and updated as appropriate: allergies, current medications, past family history, past medical history, past social history, past surgical history and problem list.    Active Ambulatory Problems     Diagnosis Date Noted   • Stroke (CMS/[facility]) 05/16/2018   • Hypertension 06/08/2018   • Vitamin D deficiency 07/09/2018   • Hyperlipidemia 08/09/2018   • Microcytic anemia 08/09/2018   • Chronic renal failure, stage 5 (CMS/[facility]) 09/06/2018     Resolved Ambulatory Problems     Diagnosis Date Noted   • No Resolved Ambulatory Problems     Past Medical History:   Diagnosis Date   • Hyperlipidemia    • Hypertension    • Stroke (CMS/[facility])      Past Surgical History:   Procedure Laterality Date   • TONSILLECTOMY       Family History   Problem Relation Age of Onset   • No Known Problems Mother    • Heart attack Father    • No Known Problems  "Sister    • Heart disease Brother    • No Known Problems Daughter    • No Known Problems Son      Social History     Social History   • Marital status: Unknown     Spouse name: N/A   • Number of children: N/A   • Years of education: N/A     Occupational History   • Not on file.     Social History Main Topics   • Smoking status: Never Smoker   • Smokeless tobacco: Never Used   • Alcohol use Yes      Comment: occasional   • Drug use: No   • Sexual activity: Not on file     Other Topics Concern   • Not on file     Social History Narrative   • No narrative on file         Review of Systems   Neurological: Positive for speech difficulty. Negative for seizures and syncope.       Objective   Blood pressure 172/98, pulse 70, temperature 97.6 °F (36.4 °C), temperature source Temporal Artery , height 177.8 cm (70\"), weight 67.9 kg (149 lb 9.6 oz), SpO2 94 %, not currently breastfeeding.  Nursing note reviewed  Physical Exam  Const: NAD, A&Ox4, Pleasant, Cooperative  Eyes: EOMI, no conjunctivitis  ENT: No nasal discharge present, neck supple  Cardiac: Regular rate and rhythm, no peripheral edema or cyanosis  Resp: Respiratory rate within normal limits, no increased work of breathing, no audible wheezing or retractions noted  GI: No distention or ascites  MSK: Strength is 4/5 in the right hand.  This is slightly diminished from the 4+/5 otherwise in the right upper extremity.  There is swelling on the dorsum of the right hand.  It is currently splinted  Neurologic, CN II-XII grossly intact.  Her speech is improved  Psych: Appropriate mood and behavior.  Skin: Pink, warm, dry  Procedures  Assessment/Plan   Cindy was seen today for hypertension.    Diagnoses and all orders for this visit:    Hypertension, unspecified type  -     Renal Function Panel; Future    Chronic renal failure, stage 5 (CMS/HCC)  -     Renal Function Panel; Future       #1 history of CVA  · She should continue low-dose aspirin in addition to atorvastatin " 40 mg.  · She has some persistent blurry vision, and should continue to follow with neuro-ophthalmology (appt 9/20)  · Blood pressure control will be paramount to her health and recovery.  She will continue isosorbide, labetalol, and nifedipine XL 90 mg.  · She should continue outpatient PT at [facility], and follow-up with Dr. Cowart in PM&R    #2 hypertension  · Continue isosorbide 30 mg 3 times daily, nifedipine XL 90 mg daily.  She is having some increased fatigue and bradycardia with the labetalol 200 mg 3 times daily.  I have asked her to resume the full 200 mg 3 times daily for blood pressure control  · Goal is less than 120/80.  · Evidently she never had a follow-up appointment with [facility], I did call their office to set up an appointment as indicated below    #3 chronic kidney disease secondary to uncontrolled long-standing hypertension  · She should  follow-up with [facility]  · 10/3 4pm Dr Bernard [facility]    #4 iron deficiency anemia  · Also with some contribution from chronic kidney disease  · She should continue to follow with [facility] for iron infusions, last on 6/13    #5 vitamin D deficiency  · Will defer to [facility] with her chronic kidney disease    #6 depression  · Related to above declining health  · She should continue fluoxetine 20 mg.  We may consider increasing this in the future.    #7 fatigue  · Related to above declining health and CKD, as well as depression     #8 right wrist fracture  · Continue to follow with orthopedics.  Done splinting.    Patient Instructions   1. NEED to follow up with [facility]  · 10/3 4:00pm Dr Bernard at [facility]    2. Follow up with Dr. Cowart at [facility]  · 10/15 3:45pm at [facility] [address] on the 1st floor by [facility] Center    3. Start taking full dose of labetalol again.      Current Outpatient Prescriptions:   •  aspirin 81 MG EC tablet, Take 1 tablet by mouth Daily., Disp: 30 tablet, Rfl: 2  •  ASPIRIN LOW DOSE 81 MG  chewable tablet, CHEW AND SWALLOW 1 T D, Disp: , Rfl: 0  •  atorvastatin (LIPITOR) 40 MG tablet, TAKE 1 TABLET BY MOUTH DAILY, Disp: 90 tablet, Rfl: 2  •  calcitriol (ROCALTROL) 0.25 MCG capsule, TAKE 1 CAPSULE BY MOUTH EVERY OTHER DAY, Disp: 45 capsule, Rfl: 2  •  castor oil-balsam peru (VENELEX) ointment, Apply  topically 2 (Two) Times a Day., Disp: , Rfl:   •  ciprofloxacin (CILOXAN) 0.3 % ophthalmic solution, Administer 1 drop into the left eye 6 (Six) Times a Day., Disp: 5 mL, Rfl: 0  •  docusate sodium (COLACE) 50 MG capsule, Take 1 capsule by mouth 2 (Two) Times a Day As Needed for Constipation., Disp: 60 capsule, Rfl: 2  •  FLUoxetine (PROzac) 20 MG capsule, TAKE 1 CAPSULE BY MOUTH DAILY, Disp: 90 capsule, Rfl: 2  •  isosorbide dinitrate (ISORDIL) 10 MG tablet, TAKE 3 TABLETS BY MOUTH THREE TIMES DAILY, Disp: 810 tablet, Rfl: 2  •  labetalol (NORMODYNE) 200 MG tablet, TAKE 1 TABLET BY MOUTH EVERY 8 HOURS, Disp: 270 tablet, Rfl: 0  •  NIFEdipine XL (PROCARDIA XL) 90 MG 24 hr tablet, TAKE 1 TABLET BY MOUTH DAILY, Disp: 90 tablet, Rfl: 2  •  Patiromer Sorbitex Calcium 8.4 g pack, Take  by mouth., Disp: , Rfl:   •  STOOL SOFTENER 100 MG capsule, TK 1 C PO BID PRF CONSTIPATION, Disp: , Rfl: 2  •  terazosin (HYTRIN) 5 MG capsule, Take 1 capsule by mouth Every Night., Disp: 30 capsule, Rfl: 2  •  sodium bicarbonate 650 MG tablet, TAKE 1 TABLET BY MOUTH THREE TIMES DAILY, Disp: 90 tablet, Rfl: 0    No Known Allergies      There is no immunization history on file for this patient.    Ambulatory progress note signed and attested to by To Bennett D.O.

## 2018-09-10 RX ORDER — SODIUM BICARBONATE 650 MG/1
TABLET ORAL
Qty: 90 TABLET | Refills: 0 | Status: SHIPPED | OUTPATIENT
Start: 2018-09-10 | End: 2018-10-08 | Stop reason: SDUPTHER

## 2018-09-24 ENCOUNTER — TELEPHONE (OUTPATIENT)
Dept: FAMILY MEDICINE CLINIC | Facility: CLINIC | Age: 48
End: 2018-09-24

## 2018-10-08 ENCOUNTER — OFFICE VISIT (OUTPATIENT)
Dept: FAMILY MEDICINE CLINIC | Facility: CLINIC | Age: 48
End: 2018-10-08

## 2018-10-08 VITALS
DIASTOLIC BLOOD PRESSURE: 72 MMHG | OXYGEN SATURATION: 96 % | SYSTOLIC BLOOD PRESSURE: 142 MMHG | WEIGHT: 153 LBS | HEIGHT: 70 IN | HEART RATE: 112 BPM | BODY MASS INDEX: 21.9 KG/M2

## 2018-10-08 DIAGNOSIS — E78.5 HYPERLIPIDEMIA, UNSPECIFIED HYPERLIPIDEMIA TYPE: ICD-10-CM

## 2018-10-08 DIAGNOSIS — N18.5 CHRONIC RENAL FAILURE, STAGE 5 (HCC): Primary | ICD-10-CM

## 2018-10-08 DIAGNOSIS — I63.9 CEREBROVASCULAR ACCIDENT (CVA), UNSPECIFIED MECHANISM (HCC): ICD-10-CM

## 2018-10-08 DIAGNOSIS — I10 HYPERTENSION, UNSPECIFIED TYPE: ICD-10-CM

## 2018-10-08 PROCEDURE — 99214 OFFICE O/P EST MOD 30 MIN: CPT | Performed by: FAMILY MEDICINE

## 2018-10-08 RX ORDER — ATORVASTATIN CALCIUM 40 MG/1
40 TABLET, FILM COATED ORAL DAILY
Qty: 90 TABLET | Refills: 1 | Status: SHIPPED | OUTPATIENT
Start: 2018-10-08 | End: 2019-05-15 | Stop reason: SDUPTHER

## 2018-10-08 RX ORDER — TERAZOSIN 5 MG/1
5 CAPSULE ORAL NIGHTLY
Qty: 90 CAPSULE | Refills: 1 | Status: SHIPPED | OUTPATIENT
Start: 2018-10-08 | End: 2019-07-22

## 2018-10-08 RX ORDER — FLUOXETINE HYDROCHLORIDE 20 MG/1
20 CAPSULE ORAL DAILY
Qty: 90 CAPSULE | Refills: 1 | Status: SHIPPED | OUTPATIENT
Start: 2018-10-08 | End: 2019-04-01 | Stop reason: SDUPTHER

## 2018-10-08 RX ORDER — AMLODIPINE BESYLATE 10 MG/1
10 TABLET ORAL DAILY
Start: 2018-10-08

## 2018-10-08 RX ORDER — SODIUM BICARBONATE 650 MG/1
650 TABLET ORAL 3 TIMES DAILY
Qty: 270 TABLET | Refills: 1 | Status: SHIPPED | OUTPATIENT
Start: 2018-10-08 | End: 2019-04-08 | Stop reason: SDUPTHER

## 2018-10-08 RX ORDER — NIFEDIPINE 90 MG/1
90 TABLET, EXTENDED RELEASE ORAL DAILY
Qty: 90 TABLET | Refills: 1 | Status: SHIPPED | OUTPATIENT
Start: 2018-10-08 | End: 2019-04-25 | Stop reason: SDUPTHER

## 2018-10-08 NOTE — PATIENT INSTRUCTIONS
1.  Have MRI done today.    2. Continue to follow up with [facility]  · Dr. Bernard wants to see you again in 1 month  · He also wants you to see the Kidney Transplant team.    3. Follow up with Dr. Cowart from [facility]  · 10/15 3:45pm at [facility] [address] on the 1st floor by [facility]  · This is in addition to appointment tomorrow at [facility].

## 2018-10-08 NOTE — PROGRESS NOTES
Subjective   Cindy Moreno is a 48 y.o. female.     Chief Complaint   Patient presents with   • Follow-up       Hypertension          Cindy Moreno presents today for Follow-up from her CVA And for her hypertension.    She is generally doing very well, she is up and walking much more than she had been previously.  She is ambulating with the use of a cane outside the house.  She followed up with her nephrologist at .  Her recent creatinine was 3.99 with an estimated GFR 14.  This is stable.  - He added amlodipine 10 mg for daily blood pressure control as well as bicarbonate 650 mg twice a day and calcitriol 0.25 µg 1 capsule Monday Wednesday Friday.  - Also continued labetalol 200 mg 3 times a day and isosorbide 30 mg every 6 hours  - He also recommended that she attend education class and consider peritoneal dialysis as part of dialysis prep  - Emphasized low-sodium diet and blood pressure control.    The following portions of the patient's history were reviewed and updated as appropriate: allergies, current medications, past family history, past medical history, past social history, past surgical history and problem list.    Active Ambulatory Problems     Diagnosis Date Noted   • Stroke (CMS/[facility]) 05/16/2018   • Hypertension 06/08/2018   • Vitamin D deficiency 07/09/2018   • Hyperlipidemia 08/09/2018   • Microcytic anemia 08/09/2018   • Chronic renal failure, stage 5 (CMS/[facility]) 09/06/2018     Resolved Ambulatory Problems     Diagnosis Date Noted   • No Resolved Ambulatory Problems     Past Medical History:   Diagnosis Date   • Hyperlipidemia    • Hypertension    • Stroke (CMS/[facility])      Past Surgical History:   Procedure Laterality Date   • TONSILLECTOMY       Family History   Problem Relation Age of Onset   • No Known Problems Mother    • Heart attack Father    • No Known Problems Sister    • Heart disease Brother    • No Known Problems Daughter    • No Known Problems Son      Social History     Social History  "  • Marital status: Unknown     Spouse name: N/A   • Number of children: N/A   • Years of education: N/A     Occupational History   • Not on file.     Social History Main Topics   • Smoking status: Never Smoker   • Smokeless tobacco: Never Used   • Alcohol use Yes      Comment: occasional   • Drug use: No   • Sexual activity: Not on file     Other Topics Concern   • Not on file     Social History Narrative   • No narrative on file         Review of Systems   Neurological: Positive for speech difficulty. Negative for seizures and syncope.       Objective   Blood pressure 142/72, pulse 112, height 177.8 cm (70\"), weight 69.4 kg (153 lb), SpO2 96 %, not currently breastfeeding.  Nursing note reviewed  Physical Exam  Const: NAD, A&Ox4, Pleasant, Cooperative  Eyes: EOMI, no conjunctivitis  ENT: No nasal discharge present, neck supple  Cardiac: Regular rate and rhythm, no peripheral edema or cyanosis  Resp: Respiratory rate within normal limits, no increased work of breathing, no audible wheezing or retractions noted  GI: No distention or ascites  MSK: Strength is 4/5 in the right hand.  This is slightly diminished from the 4+/5 otherwise in the right upper extremity.  There is swelling on the dorsum of the right hand.  It is currently splinted  Neurologic, CN II-XII grossly intact.  Her speech is improved  Psych: Appropriate mood and behavior.  Skin: Pink, warm, dry  Procedures  Assessment/Plan   Cindy was seen today for follow-up.    Diagnoses and all orders for this visit:    Chronic renal failure, stage 5 (CMS/HCC)    Cerebrovascular accident (CVA), unspecified mechanism (CMS/HCC)  -     terazosin (HYTRIN) 5 MG capsule; Take 1 capsule by mouth Every Night.  -     FLUoxetine (PROzac) 20 MG capsule; Take 1 capsule by mouth Daily.    Hypertension, unspecified type  -     amLODIPine (NORVASC) 10 MG tablet; Take 1 tablet by mouth Daily.    Hyperlipidemia, unspecified hyperlipidemia type    Other orders  -     sodium " bicarbonate 650 MG tablet; Take 1 tablet by mouth 3 (Three) Times a Day.  -     atorvastatin (LIPITOR) 40 MG tablet; Take 1 tablet by mouth Daily.  -     NIFEdipine XL (PROCARDIA XL) 90 MG 24 hr tablet; Take 1 tablet by mouth Daily.       #1 history of CVA  · She should continue low-dose aspirin in addition to atorvastatin 40 mg.  · She has some persistent blurry vision, and should continue to follow with neuro-ophthalmology (most recent appt appt 9/20)  · Blood pressure control will be paramount to her health and recovery.  She will continue isosorbide, labetalol, and nifedipine XL 90 mg.  · She should continue outpatient PT at [facility], and follow-up with Dr. Cowart in PM&R    #2 hypertension  · (Per renal) Continue amlodipine 10 mg for daily blood pressure control as well as bicarbonate 650 mg twice a day and calcitriol 0.25 µg 1 capsule Monday Wednesday Friday.  · Continue labetalol 200 mg 3 times a day and isosorbide 30 mg every 6 hours  · Goal is less than 120/80.    #3 chronic kidney disease secondary to uncontrolled long-standing hypertension  · Management per renal    #4 iron deficiency anemia  · Also with some contribution from chronic kidney disease  · She should continue to follow with [facility] for iron infusions, last on 6/13    #5 vitamin D deficiency  · Will defer to [facility] with her chronic kidney disease    #6 depression  · Related to above declining health  · She should continue fluoxetine 20 mg.  We may consider increasing this in the future.    #7 fatigue  · Related to above declining health and CKD, as well as depression     Patient Instructions   1.  Have MRI done today.    2. Continue to follow up with [facility]  · Dr. Bernard wants to see you again in 1 month  · He also wants you to see the Kidney Transplant team.    3. Follow up with Dr. Cowart from [facility]  · 10/15 3:45pm at [facility] [address] on the 1st floor by [facility] Center  · This is in addition to  appointment tomorrow at [facility].      Current Outpatient Prescriptions:   •  aspirin 81 MG EC tablet, Take 1 tablet by mouth Daily., Disp: 30 tablet, Rfl: 2  •  ASPIRIN LOW DOSE 81 MG chewable tablet, CHEW AND SWALLOW 1 T D, Disp: , Rfl: 0  •  atorvastatin (LIPITOR) 40 MG tablet, Take 1 tablet by mouth Daily., Disp: 90 tablet, Rfl: 1  •  calcitriol (ROCALTROL) 0.25 MCG capsule, TAKE 1 CAPSULE BY MOUTH EVERY OTHER DAY, Disp: 45 capsule, Rfl: 2  •  castor oil-balsam peru (VENELEX) ointment, Apply  topically 2 (Two) Times a Day., Disp: , Rfl:   •  docusate sodium (COLACE) 50 MG capsule, Take 1 capsule by mouth 2 (Two) Times a Day As Needed for Constipation., Disp: 60 capsule, Rfl: 2  •  FLUoxetine (PROzac) 20 MG capsule, Take 1 capsule by mouth Daily., Disp: 90 capsule, Rfl: 1  •  isosorbide dinitrate (ISORDIL) 10 MG tablet, TAKE 3 TABLETS BY MOUTH THREE TIMES DAILY, Disp: 810 tablet, Rfl: 2  •  labetalol (NORMODYNE) 200 MG tablet, TAKE 1 TABLET BY MOUTH EVERY 8 HOURS, Disp: 270 tablet, Rfl: 0  •  NIFEdipine XL (PROCARDIA XL) 90 MG 24 hr tablet, Take 1 tablet by mouth Daily., Disp: 90 tablet, Rfl: 1  •  Patiromer Sorbitex Calcium 8.4 g pack, Take  by mouth., Disp: , Rfl:   •  sodium bicarbonate 650 MG tablet, Take 1 tablet by mouth 3 (Three) Times a Day., Disp: 270 tablet, Rfl: 1  •  STOOL SOFTENER 100 MG capsule, TK 1 C PO BID PRF CONSTIPATION, Disp: , Rfl: 2  •  terazosin (HYTRIN) 5 MG capsule, Take 1 capsule by mouth Every Night., Disp: 90 capsule, Rfl: 1  •  amLODIPine (NORVASC) 10 MG tablet, Take 1 tablet by mouth Daily., Disp: , Rfl:   •  ciprofloxacin (CILOXAN) 0.3 % ophthalmic solution, Administer 1 drop into the left eye 6 (Six) Times a Day., Disp: 5 mL, Rfl: 0    No Known Allergies      There is no immunization history on file for this patient.    Ambulatory progress note signed and attested to by To Bennett D.O.

## 2018-11-19 DIAGNOSIS — I10 HYPERTENSION, UNSPECIFIED TYPE: ICD-10-CM

## 2018-11-19 RX ORDER — LABETALOL 200 MG/1
TABLET, FILM COATED ORAL
Qty: 270 TABLET | Refills: 0 | Status: SHIPPED | OUTPATIENT
Start: 2018-11-19 | End: 2019-03-06 | Stop reason: SDUPTHER

## 2019-01-07 ENCOUNTER — LAB (OUTPATIENT)
Dept: LAB | Facility: HOSPITAL | Age: 49
End: 2019-01-07

## 2019-01-07 ENCOUNTER — OFFICE VISIT (OUTPATIENT)
Dept: FAMILY MEDICINE CLINIC | Facility: CLINIC | Age: 49
End: 2019-01-07

## 2019-01-07 VITALS
HEIGHT: 70 IN | DIASTOLIC BLOOD PRESSURE: 84 MMHG | WEIGHT: 154 LBS | BODY MASS INDEX: 22.05 KG/M2 | OXYGEN SATURATION: 98 % | SYSTOLIC BLOOD PRESSURE: 144 MMHG

## 2019-01-07 DIAGNOSIS — N18.5 CHRONIC RENAL FAILURE, STAGE 5 (HCC): ICD-10-CM

## 2019-01-07 DIAGNOSIS — I63.9 CEREBROVASCULAR ACCIDENT (CVA), UNSPECIFIED MECHANISM (HCC): Primary | ICD-10-CM

## 2019-01-07 DIAGNOSIS — I10 HYPERTENSION, UNSPECIFIED TYPE: ICD-10-CM

## 2019-01-07 DIAGNOSIS — E78.5 HYPERLIPIDEMIA, UNSPECIFIED HYPERLIPIDEMIA TYPE: ICD-10-CM

## 2019-01-07 LAB
ALBUMIN SERPL-MCNC: 4.66 G/DL (ref 3.2–4.8)
ALBUMIN/GLOB SERPL: 1.7 G/DL (ref 1.5–2.5)
ALP SERPL-CCNC: 60 U/L (ref 25–100)
ALT SERPL W P-5'-P-CCNC: 16 U/L (ref 7–40)
ANION GAP SERPL CALCULATED.3IONS-SCNC: 6 MMOL/L (ref 3–11)
ARTICHOKE IGE QN: 68 MG/DL (ref 0–130)
AST SERPL-CCNC: 23 U/L (ref 0–33)
BASOPHILS # BLD AUTO: 0.03 10*3/MM3 (ref 0–0.2)
BASOPHILS NFR BLD AUTO: 0.7 % (ref 0–1)
BILIRUB SERPL-MCNC: 0.6 MG/DL (ref 0.3–1.2)
BUN BLD-MCNC: 37 MG/DL (ref 9–23)
BUN/CREAT SERPL: 9.9 (ref 7–25)
CALCIUM SPEC-SCNC: 10 MG/DL (ref 8.7–10.4)
CHLORIDE SERPL-SCNC: 102 MMOL/L (ref 99–109)
CHOLEST SERPL-MCNC: 188 MG/DL (ref 0–200)
CO2 SERPL-SCNC: 30 MMOL/L (ref 20–31)
CREAT BLD-MCNC: 3.74 MG/DL (ref 0.6–1.3)
DEPRECATED RDW RBC AUTO: 45.7 FL (ref 37–54)
EOSINOPHIL # BLD AUTO: 0.16 10*3/MM3 (ref 0–0.3)
EOSINOPHIL NFR BLD AUTO: 3.5 % (ref 0–3)
ERYTHROCYTE [DISTWIDTH] IN BLOOD BY AUTOMATED COUNT: 13.2 % (ref 11.3–14.5)
GFR SERPL CREATININE-BSD FRML MDRD: 16 ML/MIN/1.73
GLOBULIN UR ELPH-MCNC: 2.7 GM/DL
GLUCOSE BLD-MCNC: 97 MG/DL (ref 70–100)
HBA1C MFR BLD: 4.9 % (ref 4.8–5.6)
HCT VFR BLD AUTO: 35.8 % (ref 34.5–44)
HDLC SERPL-MCNC: 92 MG/DL (ref 40–60)
HGB BLD-MCNC: 11.5 G/DL (ref 11.5–15.5)
IMM GRANULOCYTES # BLD AUTO: 0.02 10*3/MM3 (ref 0–0.03)
IMM GRANULOCYTES NFR BLD AUTO: 0.4 % (ref 0–0.6)
LYMPHOCYTES # BLD AUTO: 1.65 10*3/MM3 (ref 0.6–4.8)
LYMPHOCYTES NFR BLD AUTO: 36.4 % (ref 24–44)
MCH RBC QN AUTO: 30.3 PG (ref 27–31)
MCHC RBC AUTO-ENTMCNC: 32.1 G/DL (ref 32–36)
MCV RBC AUTO: 94.5 FL (ref 80–99)
MONOCYTES # BLD AUTO: 0.37 10*3/MM3 (ref 0–1)
MONOCYTES NFR BLD AUTO: 8.2 % (ref 0–12)
NEUTROPHILS # BLD AUTO: 2.32 10*3/MM3 (ref 1.5–8.3)
NEUTROPHILS NFR BLD AUTO: 51.2 % (ref 41–71)
PLATELET # BLD AUTO: 293 10*3/MM3 (ref 150–450)
PMV BLD AUTO: 9.1 FL (ref 6–12)
POTASSIUM BLD-SCNC: 4.5 MMOL/L (ref 3.5–5.5)
PROT SERPL-MCNC: 7.4 G/DL (ref 5.7–8.2)
RBC # BLD AUTO: 3.79 10*6/MM3 (ref 3.89–5.14)
SODIUM BLD-SCNC: 138 MMOL/L (ref 132–146)
TRIGL SERPL-MCNC: 81 MG/DL (ref 0–150)
WBC NRBC COR # BLD: 4.53 10*3/MM3 (ref 3.5–10.8)

## 2019-01-07 PROCEDURE — 83036 HEMOGLOBIN GLYCOSYLATED A1C: CPT | Performed by: FAMILY MEDICINE

## 2019-01-07 PROCEDURE — 80053 COMPREHEN METABOLIC PANEL: CPT | Performed by: FAMILY MEDICINE

## 2019-01-07 PROCEDURE — 36415 COLL VENOUS BLD VENIPUNCTURE: CPT

## 2019-01-07 PROCEDURE — 99214 OFFICE O/P EST MOD 30 MIN: CPT | Performed by: FAMILY MEDICINE

## 2019-01-07 PROCEDURE — 85025 COMPLETE CBC W/AUTO DIFF WBC: CPT | Performed by: FAMILY MEDICINE

## 2019-01-07 PROCEDURE — 80061 LIPID PANEL: CPT | Performed by: FAMILY MEDICINE

## 2019-01-07 NOTE — PROGRESS NOTES
Subjective   Cindy Moreno is a 48 y.o. female.     Chief Complaint   Patient presents with   • Follow-up   • Hypertension   • Chronic Renal Failure       Chronic Renal Failure          Cindy Moreno presents today for Follow-up from her CVA And for her hypertension. Se overall feels she is doing well and would like to return to work.    She is generally doing very well, she is up and walking much more than she had been previously.  She is ambulating without the use of a cane or walker including outside the house.  She followed up with her nephrologist at ,and her kidney function is reportedly stable  - At her last visit with renal he added amlodipine 10 mg for daily blood pressure control as well as bicarbonate 650 mg twice a day and calcitriol 0.25 µg 1 capsule Monday Wednesday Friday.  - She continues on labetalol 200 mg 3 times a day and isosorbide 30 mg every 6 hours  - He also recommended that she attend education class and consider peritoneal dialysis as part of dialysis prep, she has not done this as of yet  - Emphasized low-sodium diet and blood pressure control.    The following portions of the patient's history were reviewed and updated as appropriate: allergies, current medications, past family history, past medical history, past social history, past surgical history and problem list.    Active Ambulatory Problems     Diagnosis Date Noted   • Stroke (CMS/[facility]) 05/16/2018   • Hypertension 06/08/2018   • Vitamin D deficiency 07/09/2018   • Hyperlipidemia 08/09/2018   • Microcytic anemia 08/09/2018   • Chronic renal failure, stage 5 (CMS/HCC) 09/06/2018     Resolved Ambulatory Problems     Diagnosis Date Noted   • No Resolved Ambulatory Problems     Past Medical History:   Diagnosis Date   • Hyperlipidemia    • Hypertension    • Stroke (CMS/[facility])      Past Surgical History:   Procedure Laterality Date   • TONSILLECTOMY       Family History   Problem Relation Age of Onset   • No Known Problems Mother    •  "Heart attack Father    • No Known Problems Sister    • Heart disease Brother    • No Known Problems Daughter    • No Known Problems Son      Social History     Socioeconomic History   • Marital status: Unknown     Spouse name: Not on file   • Number of children: Not on file   • Years of education: Not on file   • Highest education level: Not on file   Social Needs   • Financial resource strain: Not on file   • Food insecurity - worry: Not on file   • Food insecurity - inability: Not on file   • Transportation needs - medical: Not on file   • Transportation needs - non-medical: Not on file   Occupational History   • Not on file   Tobacco Use   • Smoking status: Never Smoker   • Smokeless tobacco: Never Used   Substance and Sexual Activity   • Alcohol use: Yes     Comment: occasional   • Drug use: No   • Sexual activity: Not on file   Other Topics Concern   • Not on file   Social History Narrative   • Not on file         Review of Systems   Neurological: Positive for speech difficulty. Negative for seizures and syncope.       Objective   Blood pressure 144/84, height 177.8 cm (70\"), weight 69.9 kg (154 lb), SpO2 98 %, not currently breastfeeding.  Nursing note reviewed  Physical Exam  Const: NAD, A&Ox4, Pleasant, Cooperative  Eyes: EOMI, no conjunctivitis  ENT: No nasal discharge present, neck supple  Cardiac: Regular rate and rhythm, no peripheral edema or cyanosis  Resp: Respiratory rate within normal limits, no increased work of breathing, no audible wheezing or retractions noted  GI: No distention or ascites  MSK: Strength is 4/5 in the right hand.  This is slightly diminished from the 4+/5 otherwise in the right upper extremity.  There is swelling on the dorsum of the right hand.  It is currently splinted  Neurologic, CN II-XII grossly intact.  Her speech is improved  Psych: Appropriate mood and behavior.  Skin: Pink, warm, dry  Procedures  Assessment/Plan   Cindy was seen today for follow-up, hypertension and " chronic renal failure.    Diagnoses and all orders for this visit:    Cerebrovascular accident (CVA), unspecified mechanism (CMS/HCC)    Chronic renal failure, stage 5 (CMS/HCC)  -     Comprehensive Metabolic Panel; Future  -     Hemoglobin A1c; Future  -     CBC & Differential; Future    Hypertension, unspecified type  -     Comprehensive Metabolic Panel; Future    Hyperlipidemia, unspecified hyperlipidemia type  -     Lipid Panel; Future       #1 history of CVA  · She should continue low-dose aspirin in addition to atorvastatin 40 mg.  · She has some persistent blurry vision, and should continue to follow with neuro-ophthalmology (most recent appt appt 9/20)  · Blood pressure control will be paramount to her health and recovery.  She will continue isosorbide, labetalol, and nifedipine XL 90 mg along with amlodipine  · She should continue outpatient PT at [facility], and follow-up with Dr. Cowart in PM&R    #2 hypertension  · (Per renal) Continue amlodipine 10 mg for daily blood pressure control as well as bicarbonate 650 mg twice a day and calcitriol 0.25 µg 1 capsule Monday Wednesday Friday.  · Continue labetalol 200 mg 3 times a day and isosorbide 30 mg every 6 hours  · Goal is less than 120/80.    #3 chronic kidney disease secondary to uncontrolled long-standing hypertension  · Management per renal  · She should follow up over the next month at     #4 iron deficiency anemia  · Also with some contribution from chronic kidney disease  · She should continue to follow with  nephrology for iron infusions, last on 6/13    #5 vitamin D deficiency  · Will defer to  nephrology with her chronic kidney disease    #6 depression  · Related to above declining health  · She should continue fluoxetine 20 mg.  We may consider increasing this in the future.    #7 fatigue  · Related to above declining health and CKD, as well as depression     Patient Instructions   1.  Continue same medications and supplements - as  listed.    2.  Continue well-balanced diet - low in salt and low in sugar.    3.  Maintain a routine exercise program - every week.  · Continue PT and speech therapy with [facility]    4.  A letter will be sent with your test results.    5.  Make sure you follow up with your kidney doctor (Dr. Bernard) at [facility].      Current Outpatient Medications:   •  amLODIPine (NORVASC) 10 MG tablet, Take 1 tablet by mouth Daily., Disp: , Rfl:   •  aspirin 81 MG EC tablet, Take 1 tablet by mouth Daily., Disp: 30 tablet, Rfl: 2  •  atorvastatin (LIPITOR) 40 MG tablet, Take 1 tablet by mouth Daily., Disp: 90 tablet, Rfl: 1  •  calcitriol (ROCALTROL) 0.25 MCG capsule, TAKE 1 CAPSULE BY MOUTH EVERY OTHER DAY, Disp: 45 capsule, Rfl: 2  •  castor oil-balsam peru (VENELEX) ointment, Apply  topically 2 (Two) Times a Day., Disp: , Rfl:   •  ciprofloxacin (CILOXAN) 0.3 % ophthalmic solution, Administer 1 drop into the left eye 6 (Six) Times a Day., Disp: 5 mL, Rfl: 0  •  docusate sodium (COLACE) 50 MG capsule, Take 1 capsule by mouth 2 (Two) Times a Day As Needed for Constipation., Disp: 60 capsule, Rfl: 2  •  FLUoxetine (PROzac) 20 MG capsule, Take 1 capsule by mouth Daily., Disp: 90 capsule, Rfl: 1  •  isosorbide dinitrate (ISORDIL) 10 MG tablet, TAKE 3 TABLETS BY MOUTH THREE TIMES DAILY, Disp: 810 tablet, Rfl: 2  •  labetalol (NORMODYNE) 200 MG tablet, TAKE 1 TABLET BY MOUTH EVERY 8 HOURS, Disp: 270 tablet, Rfl: 0  •  NIFEdipine XL (PROCARDIA XL) 90 MG 24 hr tablet, Take 1 tablet by mouth Daily., Disp: 90 tablet, Rfl: 1  •  Patiromer Sorbitex Calcium 8.4 g pack, Take  by mouth., Disp: , Rfl:   •  sodium bicarbonate 650 MG tablet, Take 1 tablet by mouth 3 (Three) Times a Day., Disp: 270 tablet, Rfl: 1  •  STOOL SOFTENER 100 MG capsule, TK 1 C PO BID PRF CONSTIPATION, Disp: , Rfl: 2  •  terazosin (HYTRIN) 5 MG capsule, Take 1 capsule by mouth Every Night., Disp: 90 capsule, Rfl: 1    No Known Allergies      There is no  immunization history on file for this patient.    Ambulatory progress note signed and attested to by To Bennett D.O.

## 2019-01-07 NOTE — PATIENT INSTRUCTIONS
1.  Continue same medications and supplements - as listed.    2.  Continue well-balanced diet - low in salt and low in sugar.    3.  Maintain a routine exercise program - every week.  · Continue PT and speech therapy with [facility]    4.  A letter will be sent with your test results.    5.  Make sure you follow up with your kidney doctor (Dr. Bernard) at [facility].

## 2019-01-18 ENCOUNTER — TELEPHONE (OUTPATIENT)
Dept: FAMILY MEDICINE CLINIC | Facility: CLINIC | Age: 49
End: 2019-01-18

## 2019-01-18 DIAGNOSIS — I63.9 CEREBROVASCULAR ACCIDENT (CVA), UNSPECIFIED MECHANISM (HCC): Primary | ICD-10-CM

## 2019-01-18 DIAGNOSIS — R47.01 APHASIA DUE TO ACUTE CEREBROVASCULAR ACCIDENT (CVA) (HCC): ICD-10-CM

## 2019-01-18 DIAGNOSIS — I63.9 APHASIA DUE TO ACUTE CEREBROVASCULAR ACCIDENT (CVA) (HCC): ICD-10-CM

## 2019-01-18 NOTE — TELEPHONE ENCOUNTER
PT WOULD LIKE AN ORDER OF SPEECH THERAPY AT [facility]  2 TIMES A WEEK FOR 4 WEEKS  WITH NAM.

## 2019-01-21 PROBLEM — I63.9 APHASIA DUE TO ACUTE CEREBROVASCULAR ACCIDENT (CVA) (HCC): Status: ACTIVE | Noted: 2019-01-21

## 2019-01-21 PROBLEM — R47.01 APHASIA DUE TO ACUTE CEREBROVASCULAR ACCIDENT (CVA) (HCC): Status: ACTIVE | Noted: 2019-01-21

## 2019-03-06 DIAGNOSIS — I10 HYPERTENSION, UNSPECIFIED TYPE: ICD-10-CM

## 2019-03-07 RX ORDER — LABETALOL 200 MG/1
TABLET, FILM COATED ORAL
Qty: 270 TABLET | Refills: 0 | Status: SHIPPED | OUTPATIENT
Start: 2019-03-07 | End: 2019-06-06 | Stop reason: SDUPTHER

## 2019-04-01 DIAGNOSIS — I63.9 CEREBROVASCULAR ACCIDENT (CVA), UNSPECIFIED MECHANISM (HCC): ICD-10-CM

## 2019-04-02 RX ORDER — FLUOXETINE HYDROCHLORIDE 20 MG/1
20 CAPSULE ORAL DAILY
Qty: 90 CAPSULE | Refills: 0 | Status: SHIPPED | OUTPATIENT
Start: 2019-04-02 | End: 2019-07-02 | Stop reason: SDUPTHER

## 2019-04-09 ENCOUNTER — OFFICE VISIT (OUTPATIENT)
Dept: FAMILY MEDICINE CLINIC | Facility: CLINIC | Age: 49
End: 2019-04-09

## 2019-04-09 ENCOUNTER — LAB (OUTPATIENT)
Dept: LAB | Facility: HOSPITAL | Age: 49
End: 2019-04-09

## 2019-04-09 VITALS
SYSTOLIC BLOOD PRESSURE: 138 MMHG | BODY MASS INDEX: 21.19 KG/M2 | WEIGHT: 148 LBS | DIASTOLIC BLOOD PRESSURE: 80 MMHG | OXYGEN SATURATION: 96 % | HEIGHT: 70 IN | HEART RATE: 57 BPM

## 2019-04-09 DIAGNOSIS — I10 HYPERTENSION, UNSPECIFIED TYPE: ICD-10-CM

## 2019-04-09 DIAGNOSIS — D50.9 MICROCYTIC ANEMIA: ICD-10-CM

## 2019-04-09 DIAGNOSIS — E78.5 HYPERLIPIDEMIA, UNSPECIFIED HYPERLIPIDEMIA TYPE: ICD-10-CM

## 2019-04-09 DIAGNOSIS — I10 HYPERTENSION, UNSPECIFIED TYPE: Primary | ICD-10-CM

## 2019-04-09 LAB
ALBUMIN SERPL-MCNC: 4.6 G/DL (ref 3.5–5.2)
ALBUMIN/GLOB SERPL: 1.3 G/DL
ALP SERPL-CCNC: 45 U/L (ref 39–117)
ALT SERPL W P-5'-P-CCNC: 14 U/L (ref 1–33)
ANION GAP SERPL CALCULATED.3IONS-SCNC: 16.1 MMOL/L
AST SERPL-CCNC: 16 U/L (ref 1–32)
BASOPHILS # BLD AUTO: 0.03 10*3/MM3 (ref 0–0.2)
BASOPHILS NFR BLD AUTO: 0.7 % (ref 0–1.5)
BILIRUB SERPL-MCNC: 0.4 MG/DL (ref 0.2–1.2)
BUN BLD-MCNC: 35 MG/DL (ref 6–20)
BUN/CREAT SERPL: 9.5 (ref 7–25)
CALCIUM SPEC-SCNC: 10.3 MG/DL (ref 8.6–10.5)
CHLORIDE SERPL-SCNC: 100 MMOL/L (ref 98–107)
CHOLEST SERPL-MCNC: 188 MG/DL (ref 0–200)
CO2 SERPL-SCNC: 25.9 MMOL/L (ref 22–29)
CREAT BLD-MCNC: 3.7 MG/DL (ref 0.57–1)
DEPRECATED RDW RBC AUTO: 45.1 FL (ref 37–54)
EOSINOPHIL # BLD AUTO: 0.15 10*3/MM3 (ref 0–0.4)
EOSINOPHIL NFR BLD AUTO: 3.5 % (ref 0.3–6.2)
ERYTHROCYTE [DISTWIDTH] IN BLOOD BY AUTOMATED COUNT: 12.8 % (ref 12.3–15.4)
FERRITIN SERPL-MCNC: 87.8 NG/ML (ref 13–150)
GFR SERPL CREATININE-BSD FRML MDRD: 16 ML/MIN/1.73
GLOBULIN UR ELPH-MCNC: 3.5 GM/DL
GLUCOSE BLD-MCNC: 95 MG/DL (ref 65–99)
HCT VFR BLD AUTO: 36.8 % (ref 34–46.6)
HDLC SERPL-MCNC: 95 MG/DL (ref 40–60)
HGB BLD-MCNC: 11.8 G/DL (ref 12–15.9)
IMM GRANULOCYTES # BLD AUTO: 0.01 10*3/MM3 (ref 0–0.05)
IMM GRANULOCYTES NFR BLD AUTO: 0.2 % (ref 0–0.5)
LDLC SERPL CALC-MCNC: 79 MG/DL (ref 0–100)
LDLC/HDLC SERPL: 0.83 {RATIO}
LYMPHOCYTES # BLD AUTO: 1.77 10*3/MM3 (ref 0.7–3.1)
LYMPHOCYTES NFR BLD AUTO: 41.6 % (ref 19.6–45.3)
MAGNESIUM SERPL-MCNC: 2.4 MG/DL (ref 1.6–2.6)
MCH RBC QN AUTO: 31 PG (ref 26.6–33)
MCHC RBC AUTO-ENTMCNC: 32.1 G/DL (ref 31.5–35.7)
MCV RBC AUTO: 96.6 FL (ref 79–97)
MONOCYTES # BLD AUTO: 0.39 10*3/MM3 (ref 0.1–0.9)
MONOCYTES NFR BLD AUTO: 9.2 % (ref 5–12)
NEUTROPHILS # BLD AUTO: 1.9 10*3/MM3 (ref 1.4–7)
NEUTROPHILS NFR BLD AUTO: 44.8 % (ref 42.7–76)
NRBC BLD AUTO-RTO: 0 /100 WBC (ref 0–0)
PLATELET # BLD AUTO: 227 10*3/MM3 (ref 140–450)
PMV BLD AUTO: 10 FL (ref 6–12)
POTASSIUM BLD-SCNC: 4.3 MMOL/L (ref 3.5–5.2)
PROT SERPL-MCNC: 8.1 G/DL (ref 6–8.5)
RBC # BLD AUTO: 3.81 10*6/MM3 (ref 3.77–5.28)
SODIUM BLD-SCNC: 142 MMOL/L (ref 136–145)
TRIGL SERPL-MCNC: 70 MG/DL (ref 0–150)
VLDLC SERPL-MCNC: 14 MG/DL (ref 5–40)
WBC NRBC COR # BLD: 4.25 10*3/MM3 (ref 3.4–10.8)

## 2019-04-09 PROCEDURE — 80061 LIPID PANEL: CPT | Performed by: FAMILY MEDICINE

## 2019-04-09 PROCEDURE — 82728 ASSAY OF FERRITIN: CPT | Performed by: FAMILY MEDICINE

## 2019-04-09 PROCEDURE — 36415 COLL VENOUS BLD VENIPUNCTURE: CPT

## 2019-04-09 PROCEDURE — 85025 COMPLETE CBC W/AUTO DIFF WBC: CPT | Performed by: FAMILY MEDICINE

## 2019-04-09 PROCEDURE — 83735 ASSAY OF MAGNESIUM: CPT | Performed by: FAMILY MEDICINE

## 2019-04-09 PROCEDURE — 80053 COMPREHEN METABOLIC PANEL: CPT | Performed by: FAMILY MEDICINE

## 2019-04-09 PROCEDURE — 99214 OFFICE O/P EST MOD 30 MIN: CPT | Performed by: FAMILY MEDICINE

## 2019-04-09 NOTE — PROGRESS NOTES
Subjective   Cindy Moreno is a 48 y.o. female.     Chief Complaint   Patient presents with   • Follow-up       History of Present Illness     Cindy Moreno presents today for Follow-up from her CVA And for her hypertension. She overall feels she is doing well. She tried to return to work 3 months ago but was unable to tolerate it, so she is working on disability. She is driving now which is great. Her blood pressure has been under remarkably better control.    She is ambulating without the use of a cane or walker including outside the house.  She followed up with her nephrologist at  (Dr. Bernard),and her kidney function is reportedly stable as of January  - At her last visit with renal he added amlodipine 10 mg for daily blood pressure control as well as bicarbonate 650 mg twice a day and calcitriol 0.25 µg 1 capsule Monday Wednesday Friday.  - She continues on labetalol 200 mg 3 times a day and isosorbide 30 mg every 6 hours  - He also recommended that she attend education class and consider peritoneal dialysis as part of dialysis prep, she has not done this as of yet  - Emphasized low-sodium diet and blood pressure control.    The following portions of the patient's history were reviewed and updated as appropriate: allergies, current medications, past family history, past medical history, past social history, past surgical history and problem list.    Active Ambulatory Problems     Diagnosis Date Noted   • Stroke (CMS/[facility]) 05/16/2018   • Hypertension 06/08/2018   • Vitamin D deficiency 07/09/2018   • Hyperlipidemia 08/09/2018   • Microcytic anemia 08/09/2018   • Chronic renal failure, stage 5 (CMS/[facility]) 09/06/2018   • Aphasia due to acute cerebrovascular accident (CVA) (CMS/[facility]) 01/21/2019     Resolved Ambulatory Problems     Diagnosis Date Noted   • No Resolved Ambulatory Problems     Past Medical History:   Diagnosis Date   • Hyperlipidemia    • Hypertension    • Stroke (CMS/[facility])      Past Surgical  "History:   Procedure Laterality Date   • TONSILLECTOMY       Family History   Problem Relation Age of Onset   • No Known Problems Mother    • Heart attack Father    • No Known Problems Sister    • Heart disease Brother    • No Known Problems Daughter    • No Known Problems Son      Social History     Socioeconomic History   • Marital status: Unknown     Spouse name: Not on file   • Number of children: Not on file   • Years of education: Not on file   • Highest education level: Not on file   Tobacco Use   • Smoking status: Never Smoker   • Smokeless tobacco: Never Used   Substance and Sexual Activity   • Alcohol use: Yes     Comment: occasional   • Drug use: No         Review of Systems   Neurological: Positive for speech difficulty. Negative for seizures and syncope.       Objective   Blood pressure 138/80, pulse 57, height 177.8 cm (70\"), weight 67.1 kg (148 lb), SpO2 96 %, not currently breastfeeding.  Nursing note reviewed  Physical Exam  Const: NAD, A&Ox4, Pleasant, Cooperative  Eyes: EOMI, no conjunctivitis  ENT: No nasal discharge present, neck supple  Cardiac: Regular rate and rhythm, no peripheral edema or cyanosis  Resp: Respiratory rate within normal limits, no increased work of breathing, no audible wheezing or retractions noted  GI: No distention or ascites  MSK: Strength is 4/5 in the right hand.  This is slightly diminished from the 4+/5 otherwise in the right upper extremity.  There is swelling on the dorsum of the right hand.  It is currently splinted  Neurologic, CN II-XII grossly intact.  Her speech is much improved  Psych: Appropriate mood and behavior.  Skin: Pink, warm, dry  Procedures  Assessment/Plan   Cindy was seen today for follow-up.    Diagnoses and all orders for this visit:    Hypertension, unspecified type  -     Magnesium; Future  -     Comprehensive Metabolic Panel; Future    Hyperlipidemia, unspecified hyperlipidemia type  -     Lipid Panel; Future  -     Comprehensive Metabolic " Panel; Future    Microcytic anemia  -     CBC & Differential; Future  -     Ferritin; Future       #1 history of CVA  · She should continue low-dose aspirin in addition to atorvastatin 40 mg.  · She has some persistent blurry vision, and should continue to follow with neuro-ophthalmology (most recent appt appt 9/20)  · Blood pressure control will be paramount to her health and recovery.  She will continue isosorbide, labetalol, and nifedipine XL 90 mg along with amlodipine  · She should continue outpatient PT at [facility], and follow-up with Dr. Cowart in PM&R    #2 hypertension  · (Per renal) Continue amlodipine 10 mg for daily blood pressure control as well as bicarbonate 650 mg twice a day and calcitriol 0.25 µg 1 capsule Monday Wednesday Friday.  · Continue labetalol 200 mg 3 times a day and isosorbide 30 mg every 6 hours  · Goal is less than 120/80, she is getting there slowly    #3 chronic kidney disease secondary to uncontrolled long-standing hypertension  · Management per renal  · She should follow up over the next few months at     #4 iron deficiency anemia  · Also with some contribution from chronic kidney disease  · She should continue to follow with  nephrology for iron infusions as instructed, last on 6/13  · We will check a CBC and ferritin today    #5 vitamin D deficiency  · Will defer to  nephrology with her chronic kidney disease    #6 depression  · Related to above declining health  · She should continue fluoxetine 20 mg.  We may consider increasing this in the future.    #7 fatigue  · Related to above declining health and CKD, as well as depression     Patient Instructions   1.  Continue same medications and supplements - as listed.    2.  Continue well-balanced diet - low in salt and low in sugar.    3.  Maintain a routine exercise program - every week.  · Continue PT and speech therapy with [facility]    4.  A letter will be sent with your test results.    5.  Make sure you follow  up with your kidney doctor (Dr. Bernard) at [facility].      Current Outpatient Medications:   •  amLODIPine (NORVASC) 10 MG tablet, Take 1 tablet by mouth Daily., Disp: , Rfl:   •  aspirin 81 MG EC tablet, Take 1 tablet by mouth Daily., Disp: 30 tablet, Rfl: 2  •  atorvastatin (LIPITOR) 40 MG tablet, Take 1 tablet by mouth Daily., Disp: 90 tablet, Rfl: 1  •  calcitriol (ROCALTROL) 0.25 MCG capsule, TAKE 1 CAPSULE BY MOUTH EVERY OTHER DAY, Disp: 45 capsule, Rfl: 2  •  FLUoxetine (PROzac) 20 MG capsule, TAKE 1 CAPSULE BY MOUTH DAILY, Disp: 90 capsule, Rfl: 0  •  isosorbide dinitrate (ISORDIL) 10 MG tablet, TAKE 3 TABLETS BY MOUTH THREE TIMES DAILY, Disp: 810 tablet, Rfl: 2  •  labetalol (NORMODYNE) 200 MG tablet, TAKE 1 TABLET BY MOUTH EVERY 8 HOURS, Disp: 270 tablet, Rfl: 0  •  NIFEdipine XL (PROCARDIA XL) 90 MG 24 hr tablet, Take 1 tablet by mouth Daily., Disp: 90 tablet, Rfl: 1  •  Patiromer Sorbitex Calcium 8.4 g pack, Take  by mouth., Disp: , Rfl:   •  sodium bicarbonate 650 MG tablet, Take 1 tablet by mouth 3 (Three) Times a Day., Disp: 270 tablet, Rfl: 1  •  terazosin (HYTRIN) 5 MG capsule, Take 1 capsule by mouth Every Night., Disp: 90 capsule, Rfl: 1  •  castor oil-balsam peru (VENELEX) ointment, Apply  topically 2 (Two) Times a Day., Disp: , Rfl:   •  ciprofloxacin (CILOXAN) 0.3 % ophthalmic solution, Administer 1 drop into the left eye 6 (Six) Times a Day., Disp: 5 mL, Rfl: 0  •  docusate sodium (COLACE) 50 MG capsule, Take 1 capsule by mouth 2 (Two) Times a Day As Needed for Constipation., Disp: 60 capsule, Rfl: 2  •  STOOL SOFTENER 100 MG capsule, TK 1 C PO BID PRF CONSTIPATION, Disp: , Rfl: 2    No Known Allergies      There is no immunization history on file for this patient.    Ambulatory progress note signed and attested to by To Bennett D.O.

## 2019-04-09 NOTE — PATIENT INSTRUCTIONS
1.  Continue same medications and supplements - as listed.    2.  Continue well-balanced diet - low in salt and low in sugar.    3.  Maintain a routine exercise program - every week.  · Continue PT and speech therapy with [facility]    4.  A letter will be sent with your test results.    5.  Make sure you follow up with your kidney doctor (Dr. Bernard) at [facility].

## 2019-04-10 RX ORDER — SODIUM BICARBONATE 650 MG/1
TABLET ORAL
Qty: 270 TABLET | Refills: 0 | Status: SHIPPED | OUTPATIENT
Start: 2019-04-10 | End: 2019-04-23 | Stop reason: SDUPTHER

## 2019-04-12 RX ORDER — SODIUM BICARBONATE 650 MG/1
TABLET ORAL
Qty: 270 TABLET | Refills: 0 | OUTPATIENT
Start: 2019-04-12

## 2019-04-15 RX ORDER — ISOSORBIDE DINITRATE 10 MG/1
TABLET ORAL
Qty: 810 TABLET | Refills: 0 | Status: SHIPPED | OUTPATIENT
Start: 2019-04-15 | End: 2019-07-15 | Stop reason: SDUPTHER

## 2019-04-15 RX ORDER — SODIUM BICARBONATE 650 MG/1
TABLET ORAL
Qty: 270 TABLET | Refills: 0 | OUTPATIENT
Start: 2019-04-15

## 2019-04-18 RX ORDER — SODIUM BICARBONATE 650 MG/1
TABLET ORAL
Qty: 270 TABLET | Refills: 0 | OUTPATIENT
Start: 2019-04-18

## 2019-04-22 RX ORDER — TERAZOSIN 5 MG/1
5 CAPSULE ORAL NIGHTLY
Qty: 30 CAPSULE | Refills: 3 | Status: SHIPPED | OUTPATIENT
Start: 2019-04-22 | End: 2019-08-12 | Stop reason: SDUPTHER

## 2019-04-23 ENCOUNTER — TELEPHONE (OUTPATIENT)
Dept: FAMILY MEDICINE CLINIC | Facility: CLINIC | Age: 49
End: 2019-04-23

## 2019-04-23 RX ORDER — SODIUM BICARBONATE 650 MG/1
650 TABLET ORAL 3 TIMES DAILY
Qty: 270 TABLET | Refills: 0 | Status: SHIPPED | OUTPATIENT
Start: 2019-04-23 | End: 2019-07-30 | Stop reason: SDUPTHER

## 2019-04-23 NOTE — TELEPHONE ENCOUNTER
EZEKIEL CALLED NEEDS REFILL SODUM BICARBONATE 650 MG TABLET FOR PATIENT    [facility]ZEKIEL RECD SOMETHING STATING REFILL WAS SENT BY OTHER MEANS BUT THEY HAVE RECD NOTHING.    PLEASE SEND   [facility]  [phone]

## 2019-04-25 RX ORDER — NIFEDIPINE 90 MG/1
90 TABLET, EXTENDED RELEASE ORAL DAILY
Qty: 90 TABLET | Refills: 0 | Status: SHIPPED | OUTPATIENT
Start: 2019-04-25

## 2019-05-08 RX ORDER — ASPIRIN 81 MG/1
TABLET, COATED ORAL
Qty: 30 TABLET | Refills: 0 | Status: SHIPPED | OUTPATIENT
Start: 2019-05-08 | End: 2019-06-16 | Stop reason: SDUPTHER

## 2019-05-15 ENCOUNTER — TELEPHONE (OUTPATIENT)
Dept: FAMILY MEDICINE CLINIC | Facility: CLINIC | Age: 49
End: 2019-05-15

## 2019-05-15 RX ORDER — ATORVASTATIN CALCIUM 40 MG/1
40 TABLET, FILM COATED ORAL DAILY
Qty: 90 TABLET | Refills: 1 | Status: SHIPPED | OUTPATIENT
Start: 2019-05-15 | End: 2019-10-16 | Stop reason: SDUPTHER

## 2019-06-06 DIAGNOSIS — I10 HYPERTENSION, UNSPECIFIED TYPE: ICD-10-CM

## 2019-06-07 RX ORDER — LABETALOL 200 MG/1
TABLET, FILM COATED ORAL
Qty: 270 TABLET | Refills: 0 | Status: SHIPPED | OUTPATIENT
Start: 2019-06-07

## 2019-06-17 RX ORDER — ASPIRIN 81 MG/1
TABLET, COATED ORAL
Qty: 30 TABLET | Refills: 3 | Status: SHIPPED | OUTPATIENT
Start: 2019-06-17 | End: 2019-10-14 | Stop reason: SDUPTHER

## 2019-07-02 DIAGNOSIS — I63.9 CEREBROVASCULAR ACCIDENT (CVA), UNSPECIFIED MECHANISM (HCC): ICD-10-CM

## 2019-07-02 RX ORDER — FLUOXETINE HYDROCHLORIDE 20 MG/1
20 CAPSULE ORAL DAILY
Qty: 90 CAPSULE | Refills: 0 | Status: SHIPPED | OUTPATIENT
Start: 2019-07-02 | End: 2019-10-08 | Stop reason: SDUPTHER

## 2019-07-16 RX ORDER — ISOSORBIDE DINITRATE 10 MG/1
TABLET ORAL
Qty: 810 TABLET | Refills: 0 | Status: SHIPPED | OUTPATIENT
Start: 2019-07-16 | End: 2019-10-17 | Stop reason: SDUPTHER

## 2019-07-22 ENCOUNTER — APPOINTMENT (OUTPATIENT)
Dept: LAB | Facility: HOSPITAL | Age: 49
End: 2019-07-22

## 2019-07-22 ENCOUNTER — OFFICE VISIT (OUTPATIENT)
Dept: FAMILY MEDICINE CLINIC | Facility: CLINIC | Age: 49
End: 2019-07-22

## 2019-07-22 VITALS
BODY MASS INDEX: 22.99 KG/M2 | HEIGHT: 70 IN | DIASTOLIC BLOOD PRESSURE: 80 MMHG | HEART RATE: 58 BPM | OXYGEN SATURATION: 95 % | SYSTOLIC BLOOD PRESSURE: 132 MMHG | WEIGHT: 160.6 LBS

## 2019-07-22 DIAGNOSIS — I63.9 CEREBROVASCULAR ACCIDENT (CVA), UNSPECIFIED MECHANISM (HCC): Primary | ICD-10-CM

## 2019-07-22 DIAGNOSIS — K12.30 MUCOSITIS ORAL: ICD-10-CM

## 2019-07-22 DIAGNOSIS — E55.9 VITAMIN D DEFICIENCY: ICD-10-CM

## 2019-07-22 DIAGNOSIS — I10 HYPERTENSION, UNSPECIFIED TYPE: ICD-10-CM

## 2019-07-22 DIAGNOSIS — E78.5 HYPERLIPIDEMIA, UNSPECIFIED HYPERLIPIDEMIA TYPE: ICD-10-CM

## 2019-07-22 DIAGNOSIS — N18.5 CHRONIC RENAL FAILURE, STAGE 5 (HCC): ICD-10-CM

## 2019-07-22 DIAGNOSIS — D50.9 MICROCYTIC ANEMIA: ICD-10-CM

## 2019-07-22 LAB
25(OH)D3 SERPL-MCNC: 26.6 NG/ML (ref 30–100)
ALBUMIN SERPL-MCNC: 4.5 G/DL (ref 3.5–5.2)
ALBUMIN/GLOB SERPL: 1.4 G/DL
ALP SERPL-CCNC: 47 U/L (ref 39–117)
ALT SERPL W P-5'-P-CCNC: 17 U/L (ref 1–33)
ANION GAP SERPL CALCULATED.3IONS-SCNC: 14.7 MMOL/L (ref 5–15)
AST SERPL-CCNC: 20 U/L (ref 1–32)
BASOPHILS # BLD AUTO: 0.03 10*3/MM3 (ref 0–0.2)
BASOPHILS NFR BLD AUTO: 0.7 % (ref 0–1.5)
BILIRUB SERPL-MCNC: 0.5 MG/DL (ref 0.2–1.2)
BUN BLD-MCNC: 37 MG/DL (ref 6–20)
BUN/CREAT SERPL: 9.4 (ref 7–25)
CALCIUM SPEC-SCNC: 9.9 MG/DL (ref 8.6–10.5)
CHLORIDE SERPL-SCNC: 99 MMOL/L (ref 98–107)
CHOLEST SERPL-MCNC: 177 MG/DL (ref 0–200)
CO2 SERPL-SCNC: 25.3 MMOL/L (ref 22–29)
CREAT BLD-MCNC: 3.92 MG/DL (ref 0.57–1)
DEPRECATED RDW RBC AUTO: 45.7 FL (ref 37–54)
EOSINOPHIL # BLD AUTO: 0.17 10*3/MM3 (ref 0–0.4)
EOSINOPHIL NFR BLD AUTO: 3.8 % (ref 0.3–6.2)
ERYTHROCYTE [DISTWIDTH] IN BLOOD BY AUTOMATED COUNT: 12.9 % (ref 12.3–15.4)
FERRITIN SERPL-MCNC: 87.7 NG/ML (ref 13–150)
GFR SERPL CREATININE-BSD FRML MDRD: 15 ML/MIN/1.73
GLOBULIN UR ELPH-MCNC: 3.3 GM/DL
GLUCOSE BLD-MCNC: 96 MG/DL (ref 65–99)
HCT VFR BLD AUTO: 35.8 % (ref 34–46.6)
HDLC SERPL-MCNC: 98 MG/DL (ref 40–60)
HGB BLD-MCNC: 11.1 G/DL (ref 12–15.9)
IMM GRANULOCYTES # BLD AUTO: 0.01 10*3/MM3 (ref 0–0.05)
IMM GRANULOCYTES NFR BLD AUTO: 0.2 % (ref 0–0.5)
LDLC SERPL CALC-MCNC: 68 MG/DL (ref 0–100)
LDLC/HDLC SERPL: 0.7 {RATIO}
LYMPHOCYTES # BLD AUTO: 1.61 10*3/MM3 (ref 0.7–3.1)
LYMPHOCYTES NFR BLD AUTO: 35.9 % (ref 19.6–45.3)
MCH RBC QN AUTO: 30 PG (ref 26.6–33)
MCHC RBC AUTO-ENTMCNC: 31 G/DL (ref 31.5–35.7)
MCV RBC AUTO: 96.8 FL (ref 79–97)
MONOCYTES # BLD AUTO: 0.39 10*3/MM3 (ref 0.1–0.9)
MONOCYTES NFR BLD AUTO: 8.7 % (ref 5–12)
NEUTROPHILS # BLD AUTO: 2.28 10*3/MM3 (ref 1.7–7)
NEUTROPHILS NFR BLD AUTO: 50.7 % (ref 42.7–76)
NRBC BLD AUTO-RTO: 0 /100 WBC (ref 0–0.2)
PLATELET # BLD AUTO: 242 10*3/MM3 (ref 140–450)
PMV BLD AUTO: 10.2 FL (ref 6–12)
POTASSIUM BLD-SCNC: 4 MMOL/L (ref 3.5–5.2)
PROT SERPL-MCNC: 7.8 G/DL (ref 6–8.5)
RBC # BLD AUTO: 3.7 10*6/MM3 (ref 3.77–5.28)
SODIUM BLD-SCNC: 139 MMOL/L (ref 136–145)
TRIGL SERPL-MCNC: 53 MG/DL (ref 0–150)
VLDLC SERPL-MCNC: 10.6 MG/DL (ref 5–40)
WBC NRBC COR # BLD: 4.49 10*3/MM3 (ref 3.4–10.8)

## 2019-07-22 PROCEDURE — 99214 OFFICE O/P EST MOD 30 MIN: CPT | Performed by: FAMILY MEDICINE

## 2019-07-22 PROCEDURE — 80061 LIPID PANEL: CPT | Performed by: FAMILY MEDICINE

## 2019-07-22 PROCEDURE — 82306 VITAMIN D 25 HYDROXY: CPT | Performed by: FAMILY MEDICINE

## 2019-07-22 PROCEDURE — 82728 ASSAY OF FERRITIN: CPT | Performed by: FAMILY MEDICINE

## 2019-07-22 PROCEDURE — 80053 COMPREHEN METABOLIC PANEL: CPT | Performed by: FAMILY MEDICINE

## 2019-07-22 PROCEDURE — 85025 COMPLETE CBC W/AUTO DIFF WBC: CPT | Performed by: FAMILY MEDICINE

## 2019-07-22 NOTE — PROGRESS NOTES
Subjective   Cindy Moreno is a 49 y.o. female.     Chief Complaint   Patient presents with   • Follow-up       History of Present Illness     Cindy Moreno presents today for Follow-up from her CVA And for her hypertension. She overall feels she is doing well. She tried to return to work 3 months ago but was unable to tolerate it, so she is working on disability. She is driving now which is great. Her blood pressure has been under remarkably better control.    She is ambulating without the use of a cane or walker including outside the house.  She followed up with her nephrologist at  (Dr. Bernard),and her kidney function is reportedly stable as of January  - At her last visit with renal he added amlodipine 10 mg for daily blood pressure control as well as bicarbonate 650 mg twice a day and calcitriol 0.25 µg 1 capsule Monday Wednesday Friday.  - She continues on labetalol 200 mg 3 times a day and isosorbide 30 mg every 6 hours  - He also recommended that she attend education class and consider peritoneal dialysis as part of dialysis prep, she has not done this as of yet  - Emphasized low-sodium diet and blood pressure control.    The following portions of the patient's history were reviewed and updated as appropriate: allergies, current medications, past family history, past medical history, past social history, past surgical history and problem list.    Active Ambulatory Problems     Diagnosis Date Noted   • Stroke (CMS/[facility]) 05/16/2018   • Hypertension 06/08/2018   • Vitamin D deficiency 07/09/2018   • Hyperlipidemia 08/09/2018   • Microcytic anemia 08/09/2018   • Chronic renal failure, stage 5 (CMS/[facility]) 09/06/2018   • Aphasia due to acute cerebrovascular accident (CVA) (CMS/[facility]) 01/21/2019     Resolved Ambulatory Problems     Diagnosis Date Noted   • No Resolved Ambulatory Problems     Past Medical History:   Diagnosis Date   • Hyperlipidemia    • Hypertension    • Stroke (CMS/[facility])      Past Surgical  "History:   Procedure Laterality Date   • TONSILLECTOMY       Family History   Problem Relation Age of Onset   • No Known Problems Mother    • Heart attack Father    • No Known Problems Sister    • Heart disease Brother    • No Known Problems Daughter    • No Known Problems Son      Social History     Socioeconomic History   • Marital status: Unknown     Spouse name: Not on file   • Number of children: Not on file   • Years of education: Not on file   • Highest education level: Not on file   Tobacco Use   • Smoking status: Never Smoker   • Smokeless tobacco: Never Used   Substance and Sexual Activity   • Alcohol use: Yes     Comment: occasional   • Drug use: No         Review of Systems   Neurological: Positive for speech difficulty. Negative for seizures and syncope.       Objective   Blood pressure 132/80, pulse 58, height 177.8 cm (70\"), weight 72.8 kg (160 lb 9.6 oz), SpO2 95 %, not currently breastfeeding.  Nursing note reviewed  Physical Exam  Const: NAD, A&Ox4, Pleasant, Cooperative  Eyes: EOMI, no conjunctivitis  ENT: No nasal discharge present, neck supple  Cardiac: Regular rate and rhythm, no peripheral edema or cyanosis  Resp: Respiratory rate within normal limits, no increased work of breathing, no audible wheezing or retractions noted  GI: No distention or ascites  MSK: Strength is 4/5 in the right hand.  This is slightly diminished from the 4+/5 otherwise in the right upper extremity.  There is swelling on the dorsum of the right hand.  It is currently splinted  Neurologic, CN II-XII grossly intact.  Her speech is much improved  Psych: Appropriate mood and behavior.  Skin: Pink, warm, dry  Procedures  Assessment/Plan   Cindy was seen today for follow-up.    Diagnoses and all orders for this visit:    Cerebrovascular accident (CVA), unspecified mechanism (CMS/HCC)    Hypertension, unspecified type  -     Comprehensive Metabolic Panel; Future    Hyperlipidemia, unspecified hyperlipidemia type  -     " Comprehensive Metabolic Panel; Future  -     Lipid Panel; Future    Microcytic anemia  -     CBC & Differential; Future  -     Ferritin; Future    Chronic renal failure, stage 5 (CMS/HCC)  -     Comprehensive Metabolic Panel; Future    Vitamin D deficiency  -     Vitamin D 25 Hydroxy; Future    Mucositis oral  -     diphenhydrAMINE 12.5 MG/5ML elixir 20 mL, aluminum-magnesium hydroxide-simethicone 400-400-40 MG/5ML suspension 20 mL, lidocaine viscous 2 % solution 20 mL; Swish and spit 5 mL Every 4 (Four) Hours As Needed for Mucositis.       #1 history of CVA  · She should continue low-dose aspirin in addition to atorvastatin 40 mg.  · She has some persistent blurry vision, and should continue to follow with neuro-ophthalmology (most recent appt appt 9/20)  · Blood pressure control will be paramount to her health and recovery.  She will continue isosorbide, labetalol, and nifedipine XL 90 mg along with amlodipine  · She should continue outpatient PT at [facility], and follow-up with Dr. Cowart in PM&R    #2 hypertension  · (Per renal) Continue amlodipine 10 mg for daily blood pressure control as well as bicarbonate 650 mg twice a day and calcitriol 0.25 µg 1 capsule Monday Wednesday Friday.  · Continue labetalol 200 mg 3 times a day and isosorbide 30 mg every 6 hours  · Goal is less than 120/80, she is getting there slowly    #3 chronic kidney disease secondary to uncontrolled long-standing hypertension  · Management per renal  · She should follow up over the next few months at     #4 iron deficiency anemia  · Also with some contribution from chronic kidney disease  · She should continue to follow with  nephrology for iron infusions as instructed, last on 6/13  · We will check a CBC and ferritin today    #5 vitamin D deficiency  · Will defer to  nephrology with her chronic kidney disease    #6 depression  · Related to above declining health  · She should continue fluoxetine 20 mg.  We may consider increasing  this in the future.    #7 fatigue  · Related to above declining health and CKD, as well as depression     Patient Instructions   1.  Continue medications and supplements - as listed.    2.  Continue well-balanced diet - low in calories and low in added sugar.    3.  Maintain a routine exercise program - every week.    4.  A letter will be sent with your test results.      Current Outpatient Medications:   •  amLODIPine (NORVASC) 10 MG tablet, Take 1 tablet by mouth Daily., Disp: , Rfl:   •  ASPIRIN LOW DOSE 81 MG EC tablet, TAKE 1 TABLET BY MOUTH DAILY, Disp: 30 tablet, Rfl: 3  •  atorvastatin (LIPITOR) 40 MG tablet, Take 1 tablet by mouth Daily., Disp: 90 tablet, Rfl: 1  •  calcitriol (ROCALTROL) 0.25 MCG capsule, TAKE 1 CAPSULE BY MOUTH EVERY OTHER DAY, Disp: 45 capsule, Rfl: 2  •  castor oil-balsam peru (VENELEX) ointment, Apply  topically 2 (Two) Times a Day., Disp: , Rfl:   •  ciprofloxacin (CILOXAN) 0.3 % ophthalmic solution, Administer 1 drop into the left eye 6 (Six) Times a Day., Disp: 5 mL, Rfl: 0  •  docusate sodium (COLACE) 50 MG capsule, Take 1 capsule by mouth 2 (Two) Times a Day As Needed for Constipation., Disp: 60 capsule, Rfl: 2  •  FLUoxetine (PROzac) 20 MG capsule, TAKE 1 CAPSULE BY MOUTH DAILY, Disp: 90 capsule, Rfl: 0  •  isosorbide dinitrate (ISORDIL) 10 MG tablet, TAKE 3 TABLETS BY MOUTH THREE TIMES DAILY, Disp: 810 tablet, Rfl: 0  •  labetalol (NORMODYNE) 200 MG tablet, TAKE 1 TABLET BY MOUTH EVERY 8 HOURS, Disp: 270 tablet, Rfl: 0  •  NIFEdipine XL (PROCARDIA XL) 90 MG 24 hr tablet, TAKE 1 TABLET BY MOUTH DAILY, Disp: 90 tablet, Rfl: 0  •  Patiromer Sorbitex Calcium 8.4 g pack, Take  by mouth., Disp: , Rfl:   •  sodium bicarbonate 650 MG tablet, Take 1 tablet by mouth 3 (Three) Times a Day., Disp: 270 tablet, Rfl: 0  •  STOOL SOFTENER 100 MG capsule, TK 1 C PO BID PRF CONSTIPATION, Disp: , Rfl: 2  •  terazosin (HYTRIN) 5 MG capsule, TAKE 1 CAPSULE BY MOUTH EVERY NIGHT, Disp: 30 capsule,  Rfl: 3  •  diphenhydrAMINE 12.5 MG/5ML elixir 20 mL, aluminum-magnesium hydroxide-simethicone 400-400-40 MG/5ML suspension 20 mL, lidocaine viscous 2 % solution 20 mL, Swish and spit 5 mL Every 4 (Four) Hours As Needed for Mucositis., Disp: 60 mL, Rfl: 11    No Known Allergies      There is no immunization history on file for this patient.    Ambulatory progress note signed and attested to by To Bennett D.O.

## 2019-07-31 RX ORDER — SODIUM BICARBONATE 650 MG/1
TABLET ORAL
Qty: 270 TABLET | Refills: 1 | Status: SHIPPED | OUTPATIENT
Start: 2019-07-31 | End: 2019-11-11 | Stop reason: SDUPTHER

## 2019-08-13 RX ORDER — TERAZOSIN 5 MG/1
5 CAPSULE ORAL NIGHTLY
Qty: 30 CAPSULE | Refills: 0 | Status: SHIPPED | OUTPATIENT
Start: 2019-08-13 | End: 2019-09-27 | Stop reason: SDUPTHER

## 2019-09-24 ENCOUNTER — TELEPHONE (OUTPATIENT)
Dept: FAMILY MEDICINE CLINIC | Facility: CLINIC | Age: 49
End: 2019-09-24

## 2019-09-24 NOTE — TELEPHONE ENCOUNTER
PT CALLED AND ASKED ABOUT RECORDS THAT WERE TO BE SENT TO [facility]..WE HAVE RECEIVED REQUEST FROM [facility], HOWEVER WE DONT HAVE ANY RECORDS FOR DATE 0611--TO [id number]

## 2019-09-30 RX ORDER — TERAZOSIN 5 MG/1
5 CAPSULE ORAL NIGHTLY
Qty: 30 CAPSULE | Refills: 2 | Status: SHIPPED | OUTPATIENT
Start: 2019-09-30 | End: 2019-12-10 | Stop reason: SDUPTHER

## 2019-10-08 DIAGNOSIS — I63.9 CEREBROVASCULAR ACCIDENT (CVA), UNSPECIFIED MECHANISM (HCC): ICD-10-CM

## 2019-10-08 RX ORDER — FLUOXETINE HYDROCHLORIDE 20 MG/1
20 CAPSULE ORAL DAILY
Qty: 90 CAPSULE | Refills: 0 | Status: SHIPPED | OUTPATIENT
Start: 2019-10-08 | End: 2019-12-10 | Stop reason: SDUPTHER

## 2019-10-14 RX ORDER — ASPIRIN 81 MG/1
TABLET, COATED ORAL
Qty: 30 TABLET | Refills: 5 | Status: SHIPPED | OUTPATIENT
Start: 2019-10-14 | End: 2019-10-17 | Stop reason: SDUPTHER

## 2019-10-16 RX ORDER — ATORVASTATIN CALCIUM 40 MG/1
40 TABLET, FILM COATED ORAL DAILY
Qty: 90 TABLET | Refills: 0 | Status: SHIPPED | OUTPATIENT
Start: 2019-10-16 | End: 2019-10-17 | Stop reason: SDUPTHER

## 2019-10-17 RX ORDER — ASPIRIN 81 MG/1
TABLET, COATED ORAL
Qty: 90 TABLET | Refills: 1 | Status: SHIPPED | OUTPATIENT
Start: 2019-10-17 | End: 2020-09-11

## 2019-10-17 RX ORDER — ISOSORBIDE DINITRATE 10 MG/1
TABLET ORAL
Qty: 810 TABLET | Refills: 0 | Status: CANCELLED | OUTPATIENT
Start: 2019-10-17

## 2019-10-17 RX ORDER — ATORVASTATIN CALCIUM 40 MG/1
40 TABLET, FILM COATED ORAL DAILY
Qty: 90 TABLET | Refills: 0 | Status: SHIPPED | OUTPATIENT
Start: 2019-10-17 | End: 2020-01-08

## 2019-10-17 RX ORDER — ISOSORBIDE DINITRATE 10 MG/1
TABLET ORAL
Qty: 810 TABLET | Refills: 1 | Status: SHIPPED | OUTPATIENT
Start: 2019-10-17 | End: 2020-04-07

## 2019-11-11 RX ORDER — SODIUM BICARBONATE 650 MG/1
TABLET ORAL
Qty: 270 TABLET | Refills: 0 | Status: SHIPPED | OUTPATIENT
Start: 2019-11-11 | End: 2020-04-27

## 2019-11-22 ENCOUNTER — OFFICE VISIT (OUTPATIENT)
Dept: FAMILY MEDICINE CLINIC | Facility: CLINIC | Age: 49
End: 2019-11-22

## 2019-11-22 VITALS
OXYGEN SATURATION: 96 % | DIASTOLIC BLOOD PRESSURE: 94 MMHG | SYSTOLIC BLOOD PRESSURE: 164 MMHG | HEIGHT: 70 IN | HEART RATE: 89 BPM | WEIGHT: 161.8 LBS | BODY MASS INDEX: 23.16 KG/M2

## 2019-11-22 DIAGNOSIS — I63.9 CEREBROVASCULAR ACCIDENT (CVA), UNSPECIFIED MECHANISM (HCC): Primary | ICD-10-CM

## 2019-11-22 DIAGNOSIS — Z23 NEEDS FLU SHOT: ICD-10-CM

## 2019-11-22 DIAGNOSIS — D64.9 NORMOCYTIC ANEMIA: ICD-10-CM

## 2019-11-22 DIAGNOSIS — I10 HYPERTENSION, UNSPECIFIED TYPE: ICD-10-CM

## 2019-11-22 DIAGNOSIS — E55.9 VITAMIN D DEFICIENCY: ICD-10-CM

## 2019-11-22 PROCEDURE — 90686 IIV4 VACC NO PRSV 0.5 ML IM: CPT | Performed by: FAMILY MEDICINE

## 2019-11-22 PROCEDURE — 90471 IMMUNIZATION ADMIN: CPT | Performed by: FAMILY MEDICINE

## 2019-11-22 PROCEDURE — 99214 OFFICE O/P EST MOD 30 MIN: CPT | Performed by: FAMILY MEDICINE

## 2019-11-22 NOTE — PROGRESS NOTES
Subjective   Cindy Moreno is a 49 y.o. female.     Chief Complaint   Patient presents with   • CVA f/u       History of Present Illness     Cindy Moreno presents today for Follow-up from her CVA And for her hypertension. She overall feels she is doing well. She tried to return to work 7 months ago but was unable to tolerate it, so she is working on disability. She is driving now which is great. Her blood pressure has been under remarkably better control until recently.    She is ambulating without the use of a cane or walker including outside the house.  She went to Dr. Bernard her nephrologist yesterday, he recommended that she call the dialysis people to talk more about peritoneal dialysis, keep track of her blood pressure daily.  - She takes amlodipine 10 mg for daily blood pressure control as well as bicarbonate 650 mg twice a day and calcitriol 0.25 µg 1 capsule Monday Wednesday Friday.  - She continues on labetalol 200 mg 3 times a day and isosorbide 30 mg every 6 hours  - He also recommended that she attend education class and consider peritoneal dialysis as part of dialysis prep, she has not done this as of yet  - Emphasized low-sodium diet and blood pressure control.    The following portions of the patient's history were reviewed and updated as appropriate: allergies, current medications, past family history, past medical history, past social history, past surgical history and problem list.    Active Ambulatory Problems     Diagnosis Date Noted   • Stroke (CMS/[facility]) 05/16/2018   • Hypertension 06/08/2018   • Vitamin D deficiency 07/09/2018   • Hyperlipidemia 08/09/2018   • Microcytic anemia 08/09/2018   • Chronic renal failure, stage 5 (CMS/[facility]) 09/06/2018   • Aphasia due to acute cerebrovascular accident (CVA) (CMS/[facility]) 01/21/2019     Resolved Ambulatory Problems     Diagnosis Date Noted   • No Resolved Ambulatory Problems     Past Medical History:   Diagnosis Date   • Hyperlipidemia    •  "Hypertension    • Stroke (CMS/HCC)      Past Surgical History:   Procedure Laterality Date   • TONSILLECTOMY       Family History   Problem Relation Age of Onset   • No Known Problems Mother    • Heart attack Father    • No Known Problems Sister    • Heart disease Brother    • No Known Problems Daughter    • No Known Problems Son      Social History     Socioeconomic History   • Marital status: Unknown     Spouse name: Not on file   • Number of children: Not on file   • Years of education: Not on file   • Highest education level: Not on file   Tobacco Use   • Smoking status: Never Smoker   • Smokeless tobacco: Never Used   Substance and Sexual Activity   • Alcohol use: Yes     Comment: occasional   • Drug use: No         Review of Systems   Neurological: Positive for speech difficulty. Negative for seizures and syncope.       Objective   Blood pressure 164/94, pulse 89, height 177.8 cm (70\"), weight 73.4 kg (161 lb 12.8 oz), SpO2 96 %, not currently breastfeeding.  Nursing note reviewed  Physical Exam  Const: NAD, A&Ox4, Pleasant, Cooperative  Eyes: EOMI, no conjunctivitis  ENT: No nasal discharge present, neck supple  Cardiac: Regular rate and rhythm, no peripheral edema or cyanosis  Resp: Respiratory rate within normal limits, no increased work of breathing, no audible wheezing or retractions noted  GI: No distention or ascites  MSK: Strength is 4/5 in the right hand.  This is slightly diminished from the 4+/5 otherwise in the right upper extremity.  There is swelling on the dorsum of the right hand.  It is currently splinted  Neurologic, CN II-XII grossly intact.  Her speech is much improved  Psych: Appropriate mood and behavior.  Skin: Pink, warm, dry  Procedures  Assessment/Plan   Cindy was seen today for cva f/u.    Diagnoses and all orders for this visit:    Cerebrovascular accident (CVA), unspecified mechanism (CMS/HCC)    Hypertension, unspecified type  -     Comprehensive Metabolic Panel; " Future    Vitamin D deficiency  -     Vitamin D 25 Hydroxy; Future    Normocytic anemia  -     CBC (No Diff); Future  -     Iron Profile; Future  -     Vitamin B12; Future  -     Ferritin; Future    Needs flu shot  -     Fluarix/Fluzone/Afluria/FluLaval ([id number])       #1 history of CVA  · She should continue low-dose aspirin in addition to atorvastatin 40 mg.  · She has some persistent blurry vision, and should continue to follow with neuro-ophthalmology (most recent appt appt 9/20)  · Blood pressure control will be paramount to her health and recovery.  She will continue isosorbide, labetalol, and nifedipine XL 90 mg along with amlodipine  · She should continue outpatient speech therapy every 2 weeks at [facility], and follow-up with Dr. Cowart in PM&R as needed    #2 hypertension  · (Per renal) Continue amlodipine 10 mg for daily blood pressure control as well as bicarbonate 650 mg twice a day and calcitriol 0.25 µg 1 capsule Monday Wednesday Friday.  · Continue labetalol 200 mg 3 times a day and isosorbide 30 mg every 6 hours  · Goal is less than 120/80, she is pretty far from this currently. She should look into peritoneal dialysis    #3 chronic kidney disease secondary to uncontrolled long-standing hypertension  · Management per renal  · She should follow up over in February at     #4 normocytic anemia  · Also with some contribution from chronic kidney disease  · Hb 11.1 at last visit  · She should continue to follow with  nephrology for iron infusions as instructed  · We will check a CBC and ferritin today    #5 vitamin D deficiency  · Will defer to  nephrology with her chronic kidney disease    #6 depression  · Related to above declining health  · She should continue fluoxetine 20 mg.  We may consider increasing this in the future.    #7 fatigue  · Related to above declining health and CKD, as well as depression     Patient Instructions   1.  Continue medications and supplements - as listed.    2.   Continue well-balanced diet - low in calories and low in added sugar.  -Plant-based diets have the best evidence for decreasing inflammation and chronic pain, as well as reducing the risk of heart disease and dementia.    3.  Maintain a routine exercise program - 30 minutes at least 3 days every week.  This is important even if you are active at your job.    4.  A letter will be sent with your test results or they will be made available via [employer].  If you have not heard about your results within 10 days for chronic and routine labs, or 48 hours for acute labs, please call the office.      Current Outpatient Medications:   •  amLODIPine (NORVASC) 10 MG tablet, Take 1 tablet by mouth Daily., Disp: , Rfl:   •  ASPIRIN LOW DOSE 81 MG EC tablet, TAKE 1 TABLET BY MOUTH DAILY, Disp: 90 tablet, Rfl: 1  •  atorvastatin (LIPITOR) 40 MG tablet, TAKE 1 TABLET BY MOUTH DAILY, Disp: 90 tablet, Rfl: 0  •  calcitriol (ROCALTROL) 0.25 MCG capsule, TAKE 1 CAPSULE BY MOUTH EVERY OTHER DAY, Disp: 45 capsule, Rfl: 2  •  docusate sodium (COLACE) 50 MG capsule, Take 1 capsule by mouth 2 (Two) Times a Day As Needed for Constipation., Disp: 60 capsule, Rfl: 2  •  FLUoxetine (PROzac) 20 MG capsule, TAKE 1 CAPSULE BY MOUTH DAILY, Disp: 90 capsule, Rfl: 0  •  isosorbide dinitrate (ISORDIL) 10 MG tablet, TAKE 3 TABLETS BY MOUTH THREE TIMES DAILY, Disp: 810 tablet, Rfl: 1  •  labetalol (NORMODYNE) 200 MG tablet, TAKE 1 TABLET BY MOUTH EVERY 8 HOURS, Disp: 270 tablet, Rfl: 0  •  NIFEdipine XL (PROCARDIA XL) 90 MG 24 hr tablet, TAKE 1 TABLET BY MOUTH DAILY, Disp: 90 tablet, Rfl: 0  •  Patiromer Sorbitex Calcium 8.4 g pack, Take  by mouth., Disp: , Rfl:   •  sodium bicarbonate 650 MG tablet, TAKE 1 TABLET BY MOUTH THREE TIMES DAILY, Disp: 270 tablet, Rfl: 0  •  STOOL SOFTENER 100 MG capsule, TK 1 C PO BID PRF CONSTIPATION, Disp: , Rfl: 2  •  terazosin (HYTRIN) 5 MG capsule, TAKE 1 CAPSULE BY MOUTH EVERY NIGHT, Disp: 30 capsule, Rfl: 2    No  Known Allergies    Immunization History   Administered Date(s) Administered   • FLUARIX/FLUZONE/AFLURIA/FLULAVAL QUAD 11/22/2019       Ambulatory progress note signed and attested to by To Bennett D.O.

## 2019-11-22 NOTE — PATIENT INSTRUCTIONS
1.  Continue medications and supplements - as listed.    2.  Continue well-balanced diet - low in calories and low in added sugar.  -Plant-based diets have the best evidence for decreasing inflammation and chronic pain, as well as reducing the risk of heart disease and dementia.    3.  Maintain a routine exercise program - 30 minutes at least 3 days every week.  This is important even if you are active at your job.    4.  A letter will be sent with your test results or they will be made available via [employer].  If you have not heard about your results within 10 days for chronic and routine labs, or 48 hours for acute labs, please call the office.

## 2019-12-10 DIAGNOSIS — I63.9 CEREBROVASCULAR ACCIDENT (CVA), UNSPECIFIED MECHANISM (HCC): ICD-10-CM

## 2019-12-10 RX ORDER — FLUOXETINE HYDROCHLORIDE 20 MG/1
20 CAPSULE ORAL DAILY
Qty: 90 CAPSULE | Refills: 0 | Status: SHIPPED | OUTPATIENT
Start: 2019-12-10 | End: 2020-03-09

## 2019-12-10 RX ORDER — TERAZOSIN 5 MG/1
5 CAPSULE ORAL NIGHTLY
Qty: 90 CAPSULE | Refills: 0 | Status: SHIPPED | OUTPATIENT
Start: 2019-12-10 | End: 2020-03-09

## 2020-01-08 RX ORDER — ATORVASTATIN CALCIUM 40 MG/1
40 TABLET, FILM COATED ORAL DAILY
Qty: 90 TABLET | Refills: 1 | Status: SHIPPED | OUTPATIENT
Start: 2020-01-08 | End: 2020-07-07

## 2020-03-08 DIAGNOSIS — I63.9 CEREBROVASCULAR ACCIDENT (CVA), UNSPECIFIED MECHANISM (HCC): ICD-10-CM

## 2020-03-09 RX ORDER — FLUOXETINE HYDROCHLORIDE 20 MG/1
20 CAPSULE ORAL DAILY
Qty: 90 CAPSULE | Refills: 0 | Status: SHIPPED | OUTPATIENT
Start: 2020-03-09 | End: 2020-06-08

## 2020-03-09 RX ORDER — TERAZOSIN 5 MG/1
5 CAPSULE ORAL NIGHTLY
Qty: 90 CAPSULE | Refills: 0 | Status: SHIPPED | OUTPATIENT
Start: 2020-03-09 | End: 2020-06-08

## 2020-04-07 RX ORDER — ISOSORBIDE DINITRATE 10 MG/1
TABLET ORAL
Qty: 810 TABLET | Refills: 0 | Status: SHIPPED | OUTPATIENT
Start: 2020-04-07 | End: 2020-07-07

## 2020-04-27 RX ORDER — SODIUM BICARBONATE 650 MG/1
TABLET ORAL
Qty: 270 TABLET | Refills: 0 | Status: SHIPPED | OUTPATIENT
Start: 2020-04-27 | End: 2020-07-24

## 2020-05-22 ENCOUNTER — OFFICE VISIT (OUTPATIENT)
Dept: FAMILY MEDICINE CLINIC | Facility: CLINIC | Age: 50
End: 2020-05-22

## 2020-05-22 VITALS
OXYGEN SATURATION: 99 % | TEMPERATURE: 97.8 F | HEIGHT: 70 IN | SYSTOLIC BLOOD PRESSURE: 140 MMHG | BODY MASS INDEX: 23.19 KG/M2 | HEART RATE: 64 BPM | WEIGHT: 162 LBS | DIASTOLIC BLOOD PRESSURE: 80 MMHG

## 2020-05-22 DIAGNOSIS — I10 HYPERTENSION, UNSPECIFIED TYPE: Primary | ICD-10-CM

## 2020-05-22 PROCEDURE — 99213 OFFICE O/P EST LOW 20 MIN: CPT | Performed by: FAMILY MEDICINE

## 2020-05-22 RX ORDER — CARVEDILOL 25 MG/1
25 TABLET ORAL 2 TIMES DAILY
COMMUNITY
Start: 2020-05-10

## 2020-05-22 NOTE — PATIENT INSTRUCTIONS
1.  Blood pressure elevated today, discuss with kidney doctor    2.  When you return to work, would cap at 4 calls per hour for the first month or so.

## 2020-06-07 DIAGNOSIS — I63.9 CEREBROVASCULAR ACCIDENT (CVA), UNSPECIFIED MECHANISM (HCC): ICD-10-CM

## 2020-06-08 RX ORDER — TERAZOSIN 5 MG/1
5 CAPSULE ORAL NIGHTLY
Qty: 90 CAPSULE | Refills: 0 | Status: SHIPPED | OUTPATIENT
Start: 2020-06-08 | End: 2020-09-11

## 2020-06-08 RX ORDER — FLUOXETINE HYDROCHLORIDE 20 MG/1
20 CAPSULE ORAL DAILY
Qty: 90 CAPSULE | Refills: 0 | Status: SHIPPED | OUTPATIENT
Start: 2020-06-08 | End: 2020-09-11

## 2020-07-07 RX ORDER — ISOSORBIDE DINITRATE 10 MG/1
TABLET ORAL
Qty: 810 TABLET | Refills: 0 | Status: SHIPPED | OUTPATIENT
Start: 2020-07-07 | End: 2020-10-06 | Stop reason: SDUPTHER

## 2020-07-07 RX ORDER — ATORVASTATIN CALCIUM 40 MG/1
40 TABLET, FILM COATED ORAL DAILY
Qty: 90 TABLET | Refills: 1 | Status: SHIPPED | OUTPATIENT
Start: 2020-07-07 | End: 2021-01-05 | Stop reason: SDUPTHER

## 2020-07-24 RX ORDER — SODIUM BICARBONATE 650 MG/1
TABLET ORAL
Qty: 270 TABLET | Refills: 0 | Status: SHIPPED | OUTPATIENT
Start: 2020-07-24 | End: 2020-09-11

## 2020-09-10 DIAGNOSIS — I63.9 CEREBROVASCULAR ACCIDENT (CVA), UNSPECIFIED MECHANISM (HCC): ICD-10-CM

## 2020-09-11 RX ORDER — SODIUM BICARBONATE 650 MG/1
TABLET ORAL
Qty: 270 TABLET | Refills: 0 | Status: SHIPPED | OUTPATIENT
Start: 2020-09-11 | End: 2020-10-23 | Stop reason: SDUPTHER

## 2020-09-11 RX ORDER — ASPIRIN 81 MG/1
TABLET, COATED ORAL
Qty: 90 TABLET | Refills: 1 | Status: SHIPPED | OUTPATIENT
Start: 2020-09-11 | End: 2020-10-14 | Stop reason: SDUPTHER

## 2020-09-11 RX ORDER — TERAZOSIN 5 MG/1
5 CAPSULE ORAL NIGHTLY
Qty: 90 CAPSULE | Refills: 0 | Status: SHIPPED | OUTPATIENT
Start: 2020-09-11 | End: 2020-12-09 | Stop reason: SDUPTHER

## 2020-09-11 RX ORDER — FLUOXETINE HYDROCHLORIDE 20 MG/1
20 CAPSULE ORAL DAILY
Qty: 90 CAPSULE | Refills: 0 | Status: SHIPPED | OUTPATIENT
Start: 2020-09-11 | End: 2020-12-09 | Stop reason: SDUPTHER

## 2020-10-06 RX ORDER — ISOSORBIDE DINITRATE 10 MG/1
30 TABLET ORAL 3 TIMES DAILY
Qty: 810 TABLET | Refills: 0 | Status: SHIPPED | OUTPATIENT
Start: 2020-10-06 | End: 2020-10-21 | Stop reason: SDUPTHER

## 2020-10-14 ENCOUNTER — TELEPHONE (OUTPATIENT)
Dept: FAMILY MEDICINE CLINIC | Facility: CLINIC | Age: 50
End: 2020-10-14

## 2020-10-14 DIAGNOSIS — E55.9 VITAMIN D DEFICIENCY: ICD-10-CM

## 2020-10-14 RX ORDER — CALCITRIOL 0.25 UG/1
0.25 CAPSULE, LIQUID FILLED ORAL EVERY OTHER DAY
Qty: 45 CAPSULE | Refills: 2 | Status: SHIPPED | OUTPATIENT
Start: 2020-10-14 | End: 2021-01-01

## 2020-10-14 RX ORDER — ASPIRIN 81 MG/1
81 TABLET ORAL DAILY
Qty: 90 TABLET | Refills: 3 | Status: SHIPPED | OUTPATIENT
Start: 2020-10-14 | End: 2021-01-01

## 2020-10-14 NOTE — TELEPHONE ENCOUNTER
Pharmacy requesting a refill on:  ASPIRIN LOW DOSE 81 MG EC tablet  calcitriol (ROCALTROL) 0.25 MCG capsule    Send to:  [facility] #[id number] - [address]  AT [facility] - [phone] [facility] [phone] FX

## 2020-10-21 NOTE — TELEPHONE ENCOUNTER
Caller: TiffanieCindy    Relationship: Self    Best call back number: [phone]    Medication needed:   Requested Prescriptions     Pending Prescriptions Disp Refills   • isosorbide dinitrate (ISORDIL) 10 MG tablet 810 tablet 0     Sig: Take 3 tablets by mouth 3 (Three) Times a Day.       What details did the patient provide when requesting the medication: PATIENT STATES NO MEDICATION LEFT.     Does the patient have less than a 3 day supply:  [x] Yes  [] No    What is the patient's preferred pharmacy: [facility] #[id number] [address]  AT [facility] - [phone] [facility] [phone] FX

## 2020-10-22 RX ORDER — ISOSORBIDE DINITRATE 10 MG/1
30 TABLET ORAL 3 TIMES DAILY
Qty: 810 TABLET | Refills: 0 | Status: SHIPPED | OUTPATIENT
Start: 2020-10-22 | End: 2021-01-01

## 2020-10-23 RX ORDER — SODIUM BICARBONATE 650 MG/1
650 TABLET ORAL 3 TIMES DAILY
Qty: 270 TABLET | Refills: 0 | Status: SHIPPED | OUTPATIENT
Start: 2020-10-23 | End: 2021-01-01 | Stop reason: SDUPTHER

## 2020-11-11 RX ORDER — CARVEDILOL 25 MG/1
25 TABLET ORAL 2 TIMES DAILY
Status: CANCELLED | OUTPATIENT
Start: 2020-11-11

## 2020-11-11 NOTE — TELEPHONE ENCOUNTER
Caller: [id number] - [place], [place] - [address] [facility] [address]  AT [facility] - [phone]  - [phone] FX    Relationship: Pharmacy    Best call back number: [phone]    Medication needed:   Requested Prescriptions     Pending Prescriptions Disp Refills   • carvedilol (COREG) 25 MG tablet        Sig: Take 1 tablet by mouth 2 (Two) Times a Day.       When do you need the refill by: 11/11/20    What details did the patient provide when requesting the medication: patient is almost out    Does the patient have less than a 3 day supply:  [x] Yes  [] No    What is the patient's preferred pharmacy:  [address], [place], [place] [phone]

## 2020-11-12 NOTE — TELEPHONE ENCOUNTER
READ PATIENT THE NOTE THAT DR CHINCHILLA LEFT. SHE UNDERSTOOD AND WILL CALL THE PRESCRIBING PHYSICIAN. SHE HAD NO FURTHER QUESTIONS.

## 2020-11-13 RX ORDER — NIFEDIPINE 90 MG/1
90 TABLET, EXTENDED RELEASE ORAL DAILY
Qty: 90 TABLET | Refills: 0 | OUTPATIENT
Start: 2020-11-13

## 2020-11-13 NOTE — TELEPHONE ENCOUNTER
This has not been written in a year and a half.  Her nephrologist or cardiologist has been writing these as far as I am aware.

## 2020-11-16 NOTE — TELEPHONE ENCOUNTER
Pharmacy Name: [id number] [place], [place] - [address] [address]  AT [facility] - [phone] [facility] [phone]      Pharmacy representative name: JUAN ALBERTO    Pharmacy representative phone number: [phone]    What medication are you calling in regards to: carvedilol (COREG) 25 MG tablet    What question does the pharmacy have: REFILL    Who is the provider that prescribed the medication: REQUEST BY PATIENT TO CALL DR. CHINCHILLA    Additional notes:

## 2020-11-18 RX ORDER — CARVEDILOL 25 MG/1
25 TABLET ORAL 2 TIMES DAILY
Qty: 180 TABLET | Refills: 0 | OUTPATIENT
Start: 2020-11-18

## 2020-11-18 NOTE — TELEPHONE ENCOUNTER
Patient called back and is unable to remember the name of the provider who manages her carvedilol. She states she has the clinic contact info and will reach out the them for further refills

## 2020-11-19 RX ORDER — CARVEDILOL 25 MG/1
25 TABLET ORAL 2 TIMES DAILY
OUTPATIENT
Start: 2020-11-19

## 2020-11-30 RX ORDER — CARVEDILOL 25 MG/1
25 TABLET ORAL 2 TIMES DAILY
Qty: 30 TABLET | Refills: 0 | OUTPATIENT
Start: 2020-11-30

## 2020-12-02 NOTE — TELEPHONE ENCOUNTER
Called and spoke with patient and delayed message she has been taking the Labetalol 200mg 1-BID but thinks that she may have also been taking Carvedilol, she will continue with only the Labetalol 200mg 1-BID until her appointment 12/8/20. No further questions or concerns.

## 2020-12-09 DIAGNOSIS — I63.9 CEREBROVASCULAR ACCIDENT (CVA), UNSPECIFIED MECHANISM (HCC): ICD-10-CM

## 2020-12-11 RX ORDER — TERAZOSIN 5 MG/1
5 CAPSULE ORAL NIGHTLY
Qty: 90 CAPSULE | Refills: 0 | Status: SHIPPED | OUTPATIENT
Start: 2020-12-11 | End: 2021-01-01 | Stop reason: SDUPTHER

## 2020-12-11 RX ORDER — FLUOXETINE HYDROCHLORIDE 20 MG/1
20 CAPSULE ORAL DAILY
Qty: 90 CAPSULE | Refills: 0 | Status: SHIPPED | OUTPATIENT
Start: 2020-12-11 | End: 2021-01-01 | Stop reason: SDUPTHER

## 2020-12-15 ENCOUNTER — LAB (OUTPATIENT)
Dept: LAB | Facility: HOSPITAL | Age: 50
End: 2020-12-15

## 2020-12-15 ENCOUNTER — OFFICE VISIT (OUTPATIENT)
Dept: FAMILY MEDICINE CLINIC | Facility: CLINIC | Age: 50
End: 2020-12-15

## 2020-12-15 VITALS
BODY MASS INDEX: 23.77 KG/M2 | OXYGEN SATURATION: 96 % | HEART RATE: 64 BPM | DIASTOLIC BLOOD PRESSURE: 70 MMHG | WEIGHT: 166 LBS | RESPIRATION RATE: 16 BRPM | HEIGHT: 70 IN | SYSTOLIC BLOOD PRESSURE: 124 MMHG

## 2020-12-15 DIAGNOSIS — E78.5 HYPERLIPIDEMIA, UNSPECIFIED HYPERLIPIDEMIA TYPE: ICD-10-CM

## 2020-12-15 DIAGNOSIS — I10 HYPERTENSION, UNSPECIFIED TYPE: ICD-10-CM

## 2020-12-15 DIAGNOSIS — N18.5 CHRONIC RENAL FAILURE, STAGE 5 (HCC): ICD-10-CM

## 2020-12-15 DIAGNOSIS — Z12.11 COLON CANCER SCREENING: ICD-10-CM

## 2020-12-15 DIAGNOSIS — I63.9 CEREBROVASCULAR ACCIDENT (CVA), UNSPECIFIED MECHANISM (HCC): ICD-10-CM

## 2020-12-15 DIAGNOSIS — Z12.31 ENCOUNTER FOR SCREENING MAMMOGRAM FOR MALIGNANT NEOPLASM OF BREAST: ICD-10-CM

## 2020-12-15 DIAGNOSIS — E55.9 VITAMIN D DEFICIENCY: ICD-10-CM

## 2020-12-15 DIAGNOSIS — Z00.00 MEDICARE ANNUAL WELLNESS VISIT, SUBSEQUENT: Primary | ICD-10-CM

## 2020-12-15 DIAGNOSIS — Z00.00 PREVENTATIVE HEALTH CARE: ICD-10-CM

## 2020-12-15 LAB
25(OH)D3 SERPL-MCNC: 47.4 NG/ML (ref 30–100)
ALBUMIN SERPL-MCNC: 4.6 G/DL (ref 3.5–5.2)
ALBUMIN/GLOB SERPL: 1.3 G/DL
ALP SERPL-CCNC: 60 U/L (ref 39–117)
ALT SERPL W P-5'-P-CCNC: 9 U/L (ref 1–33)
ANION GAP SERPL CALCULATED.3IONS-SCNC: 13.3 MMOL/L (ref 5–15)
AST SERPL-CCNC: 18 U/L (ref 1–32)
BACTERIA UR QL AUTO: ABNORMAL /HPF
BASOPHILS # BLD AUTO: 0.04 10*3/MM3 (ref 0–0.2)
BASOPHILS NFR BLD AUTO: 0.8 % (ref 0–1.5)
BILIRUB SERPL-MCNC: 0.4 MG/DL (ref 0–1.2)
BILIRUB UR QL STRIP: NEGATIVE
BUN SERPL-MCNC: 33 MG/DL (ref 6–20)
BUN/CREAT SERPL: 8 (ref 7–25)
CALCIUM SPEC-SCNC: 9.5 MG/DL (ref 8.6–10.5)
CHLORIDE SERPL-SCNC: 101 MMOL/L (ref 98–107)
CHOLEST SERPL-MCNC: 221 MG/DL (ref 0–200)
CLARITY UR: CLEAR
CO2 SERPL-SCNC: 23.7 MMOL/L (ref 22–29)
COLOR UR: YELLOW
CREAT SERPL-MCNC: 4.1 MG/DL (ref 0.57–1)
DEPRECATED RDW RBC AUTO: 44.2 FL (ref 37–54)
EOSINOPHIL # BLD AUTO: 0.13 10*3/MM3 (ref 0–0.4)
EOSINOPHIL NFR BLD AUTO: 2.6 % (ref 0.3–6.2)
ERYTHROCYTE [DISTWIDTH] IN BLOOD BY AUTOMATED COUNT: 13.6 % (ref 12.3–15.4)
GFR SERPL CREATININE-BSD FRML MDRD: 14 ML/MIN/1.73
GFR SERPL CREATININE-BSD FRML MDRD: ABNORMAL ML/MIN/{1.73_M2}
GLOBULIN UR ELPH-MCNC: 3.5 GM/DL
GLUCOSE SERPL-MCNC: 99 MG/DL (ref 65–99)
GLUCOSE UR STRIP-MCNC: NEGATIVE MG/DL
HCT VFR BLD AUTO: 34 % (ref 34–46.6)
HDLC SERPL-MCNC: 94 MG/DL (ref 40–60)
HGB BLD-MCNC: 11.5 G/DL (ref 12–15.9)
HGB UR QL STRIP.AUTO: NEGATIVE
HYALINE CASTS UR QL AUTO: ABNORMAL /LPF
IMM GRANULOCYTES # BLD AUTO: 0.01 10*3/MM3 (ref 0–0.05)
IMM GRANULOCYTES NFR BLD AUTO: 0.2 % (ref 0–0.5)
KETONES UR QL STRIP: NEGATIVE
LDLC SERPL CALC-MCNC: 112 MG/DL (ref 0–100)
LDLC/HDLC SERPL: 1.17 {RATIO}
LEUKOCYTE ESTERASE UR QL STRIP.AUTO: NEGATIVE
LYMPHOCYTES # BLD AUTO: 1.42 10*3/MM3 (ref 0.7–3.1)
LYMPHOCYTES NFR BLD AUTO: 28.8 % (ref 19.6–45.3)
MCH RBC QN AUTO: 30.6 PG (ref 26.6–33)
MCHC RBC AUTO-ENTMCNC: 33.8 G/DL (ref 31.5–35.7)
MCV RBC AUTO: 90.4 FL (ref 79–97)
MONOCYTES # BLD AUTO: 0.41 10*3/MM3 (ref 0.1–0.9)
MONOCYTES NFR BLD AUTO: 8.3 % (ref 5–12)
NEUTROPHILS NFR BLD AUTO: 2.92 10*3/MM3 (ref 1.7–7)
NEUTROPHILS NFR BLD AUTO: 59.3 % (ref 42.7–76)
NITRITE UR QL STRIP: NEGATIVE
NRBC BLD AUTO-RTO: 0 /100 WBC (ref 0–0.2)
PH UR STRIP.AUTO: 7 [PH] (ref 5–8)
PLATELET # BLD AUTO: 233 10*3/MM3 (ref 140–450)
PMV BLD AUTO: 10.1 FL (ref 6–12)
POTASSIUM SERPL-SCNC: 4.2 MMOL/L (ref 3.5–5.2)
PROT SERPL-MCNC: 8.1 G/DL (ref 6–8.5)
PROT UR QL STRIP: ABNORMAL
RBC # BLD AUTO: 3.76 10*6/MM3 (ref 3.77–5.28)
RBC # UR: ABNORMAL /HPF
REF LAB TEST METHOD: ABNORMAL
SODIUM SERPL-SCNC: 138 MMOL/L (ref 136–145)
SP GR UR STRIP: 1.01 (ref 1–1.03)
SQUAMOUS #/AREA URNS HPF: ABNORMAL /HPF
T4 FREE SERPL-MCNC: 1.21 NG/DL (ref 0.93–1.7)
TRIGL SERPL-MCNC: 85 MG/DL (ref 0–150)
TSH SERPL DL<=0.05 MIU/L-ACNC: 4.87 UIU/ML (ref 0.27–4.2)
UROBILINOGEN UR QL STRIP: ABNORMAL
VLDLC SERPL-MCNC: 15 MG/DL (ref 5–40)
WBC # BLD AUTO: 4.93 10*3/MM3 (ref 3.4–10.8)
WBC UR QL AUTO: ABNORMAL /HPF

## 2020-12-15 PROCEDURE — 84443 ASSAY THYROID STIM HORMONE: CPT

## 2020-12-15 PROCEDURE — 80053 COMPREHEN METABOLIC PANEL: CPT

## 2020-12-15 PROCEDURE — 81001 URINALYSIS AUTO W/SCOPE: CPT

## 2020-12-15 PROCEDURE — 82306 VITAMIN D 25 HYDROXY: CPT

## 2020-12-15 PROCEDURE — G0402 INITIAL PREVENTIVE EXAM: HCPCS | Performed by: FAMILY MEDICINE

## 2020-12-15 PROCEDURE — 85025 COMPLETE CBC W/AUTO DIFF WBC: CPT

## 2020-12-15 PROCEDURE — 80061 LIPID PANEL: CPT

## 2020-12-15 PROCEDURE — 84439 ASSAY OF FREE THYROXINE: CPT

## 2020-12-15 NOTE — PROGRESS NOTES
QUICK REFERENCE INFORMATION:  The ABCs of the Annual Wellness Visit    Medicare Subsequent Wellness Visit    Chief Complaint   Patient presents with   • Annual Exam        HPI     Cindy Moreno is a 50 y.o. female presenting for subsequent annual wellness visit.  She is status post new 2 years ago, has chronic end-stage renal failure, sees  nephrology.  She is being evaluated for a fistula placement for dialysis of the next month.    This patient is accompanied by their self who contributes to the history of their care.    Past Medical History:   Diagnosis Date   • Hyperlipidemia    • Hypertension    • Stroke (CMS/HCC)       Past Surgical History:   Procedure Laterality Date   • TONSILLECTOMY       Family History   Problem Relation Age of Onset   • No Known Problems Mother    • Heart attack Father    • No Known Problems Sister    • Heart disease Brother    • No Known Problems Daughter    • No Known Problems Son       Social History     Socioeconomic History   • Marital status: Unknown     Spouse name: Not on file   • Number of children: Not on file   • Years of education: Not on file   • Highest education level: Not on file   Tobacco Use   • Smoking status: Never Smoker   • Smokeless tobacco: Never Used   Substance and Sexual Activity   • Alcohol use: Yes     Comment: occasional   • Drug use: No      No Known Allergies   Outpatient Medications Prior to Visit   Medication Sig Dispense Refill   • amLODIPine (NORVASC) 10 MG tablet Take 1 tablet by mouth Daily.     • aspirin (Aspirin Low Dose) 81 MG EC tablet Take 1 tablet by mouth Daily. 90 tablet 3   • atorvastatin (LIPITOR) 40 MG tablet TAKE 1 TABLET BY MOUTH DAILY 90 tablet 1   • calcitriol (ROCALTROL) 0.25 MCG capsule Take 1 capsule by mouth Every Other Day. 45 capsule 2   • carvedilol (COREG) 25 MG tablet Take 25 mg by mouth 2 (Two) Times a Day.     • FLUoxetine (PROzac) 20 MG capsule Take 1 capsule by mouth Daily. 90 capsule 0   • isosorbide dinitrate  "(ISORDIL) 10 MG tablet Take 3 tablets by mouth 3 (Three) Times a Day. 810 tablet 0   • labetalol (NORMODYNE) 200 MG tablet TAKE 1 TABLET BY MOUTH EVERY 8 HOURS 270 tablet 0   • NIFEdipine XL (PROCARDIA XL) 90 MG 24 hr tablet TAKE 1 TABLET BY MOUTH DAILY 90 tablet 0   • Patiromer Sorbitex Calcium 8.4 g pack Take  by mouth.     • sodium bicarbonate 650 MG tablet Take 1 tablet by mouth 3 (Three) Times a Day. 270 tablet 0   • terazosin (HYTRIN) 5 MG capsule Take 1 capsule by mouth Every Night. 90 capsule 0   • docusate sodium (COLACE) 50 MG capsule Take 1 capsule by mouth 2 (Two) Times a Day As Needed for Constipation. 60 capsule 2   • STOOL SOFTENER 100 MG capsule TK 1 C PO BID PRF CONSTIPATION  2     No facility-administered medications prior to visit.        Reviewed use of high risk medication in the elderly: yes  Reviewed for potential of harmful drug interactions in the elderly: yes    The following portions of the patient's history were reviewed and updated as appropriate: allergies, current medications, past family history, past medical history, past social history, past surgical history and problem list.    Review of Systems   Review of Systems -  General ROS: negative for - chills, fever or night sweats  Cardiovascular ROS: no chest pain or dyspnea on exertion  Gastrointestinal ROS: no abdominal pain, change in bowel habits, or black or bloody stools  Genito-Urinary ROS: no dysuria, trouble voiding, or hematuria.    Vitals:    12/15/20 0943   BP: 124/70   Pulse: 64   Resp: 16   SpO2: 96%   Weight: 75.3 kg (166 lb)   Height: 177.8 cm (70\")       Objective    Physical Exam   Const: NAD, A&Ox4, Pleasant, Cooperative  Eyes: EOMI, no conjunctivitis  ENT: No nasal discharge present, neck supple  Cardiac: Regular rate and rhythm, no cyanosis  Resp: Respiratory rate within normal limits, no increased work of breathing, no audible wheezing or retractions noted  GI: No distention or ascites  MSK: Motor and sensation " grossly intact in bilateral upper extremities  Neurologic: CN II-XII grossly intact  Psych: Appropriate mood and behavior.  HEALTH RISK ASSESSMENT    1970    Recent Hospitalizations:  No hospitalization(s) within the last year..      Current Medical Providers:  Patient Care Team:  To Bennett DO as PCP - General (Family Medicine)  Shayy Fernandez MD (Physical Medicine and Rehabilitation)  Aneudy Bernard (Nurse Practitioner)      Smoking Status:  Social History     Tobacco Use   Smoking Status Never Smoker   Smokeless Tobacco Never Used       Alcohol Consumption:  Social History     Substance and Sexual Activity   Alcohol Use Yes    Comment: occasional       Depression Screen:   PHQ-2/PHQ-9 Depression Screening 12/15/2020   Little interest or pleasure in doing things 0   Feeling down, depressed, or hopeless 0   Total Score 0       Health Habits and Functional and Cognitive Screening:  No flowsheet data found.      Does the patient have evidence of cognitive impairment? No        Aspirin use counseling? Taking ASA appropriately as indicated      Recent Lab Results:  CMP:  Lab Results   Component Value Date    BUN 33 (H) 12/15/2020    CREATININE 4.10 (H) 12/15/2020    EGFRIFNONA  12/15/2020      Comment:      <15 Indicative of kidney failure.    EGFRIFAFRI 14 (L) 12/15/2020    BCR 8.0 12/15/2020     12/15/2020    K 4.2 12/15/2020    CO2 23.7 12/15/2020    CALCIUM 9.5 12/15/2020    ALBUMIN 4.60 12/15/2020    BILITOT 0.4 12/15/2020    ALKPHOS 60 12/15/2020    AST 18 12/15/2020    ALT 9 12/15/2020     Lipid Panel:  Lab Results   Component Value Date    CHOL 221 (H) 12/15/2020    TRIG 85 12/15/2020    HDL 94 (H) 12/15/2020    VLDL 15 12/15/2020    LDLHDL 1.17 12/15/2020     HbA1c:  Lab Results   Component Value Date    HGBA1C 4.90 01/07/2019       Visual Acuity:  No exam data present    Age-appropriate Screening Schedule:  Refer to the list below for future screening recommendations based on  patient's age, sex and/or medical conditions. Orders for these recommended tests are listed in the plan section. The patient has been provided with a written plan.    Health Maintenance   Topic Date Due   • MAMMOGRAM  1970   • COLONOSCOPY  1970   • PAP SMEAR  05/16/2019   • ZOSTER VACCINE (1 of 2) 07/05/2019   • INFLUENZA VACCINE  08/01/2020   • LIPID PANEL  12/15/2021   • TDAP/TD VACCINES (2 - Td) 04/18/2026          Advance Care Planning:  ACP discussion was held with the patient during this visit. Patient does not have an advance directive, information provided.    Identification of Risk Factors:  Risk factors include: Advance Directive Discussion  Cardiovascular risk  Fall Risk.    Compared to one year ago, the patient feels his physical health is better.  Compared to one year ago, the patient feels his mental health is better.      Diagnoses and all orders for this visit:    1. Medicare annual wellness visit, subsequent (Primary)    2. Preventative health care    3. Hypertension, unspecified type  -     Comprehensive Metabolic Panel; Future  -     CBC & Differential; Future  -     Lipid Panel; Future  -     TSH Rfx On Abnormal To Free T4; Future  -     Urinalysis With Microscopic If Indicated (No Culture) - Urine, Clean Catch; Future  -     Vitamin D 25 Hydroxy; Future    4. Cerebrovascular accident (CVA), unspecified mechanism (CMS/HCC)  -     Comprehensive Metabolic Panel; Future  -     CBC & Differential; Future  -     Lipid Panel; Future  -     TSH Rfx On Abnormal To Free T4; Future  -     Urinalysis With Microscopic If Indicated (No Culture) - Urine, Clean Catch; Future  -     Vitamin D 25 Hydroxy; Future    5. Vitamin D deficiency  -     Comprehensive Metabolic Panel; Future  -     CBC & Differential; Future  -     Lipid Panel; Future  -     TSH Rfx On Abnormal To Free T4; Future  -     Urinalysis With Microscopic If Indicated (No Culture) - Urine, Clean Catch; Future  -     Vitamin D 25  Hydroxy; Future    6. Hyperlipidemia, unspecified hyperlipidemia type  -     Comprehensive Metabolic Panel; Future  -     CBC & Differential; Future  -     Lipid Panel; Future  -     TSH Rfx On Abnormal To Free T4; Future  -     Urinalysis With Microscopic If Indicated (No Culture) - Urine, Clean Catch; Future  -     Vitamin D 25 Hydroxy; Future    7. Chronic renal failure, stage 5 (CMS/HCC)  -     Comprehensive Metabolic Panel; Future  -     CBC & Differential; Future  -     Lipid Panel; Future  -     TSH Rfx On Abnormal To Free T4; Future  -     Urinalysis With Microscopic If Indicated (No Culture) - Urine, Clean Catch; Future  -     Vitamin D 25 Hydroxy; Future    8. Encounter for screening mammogram for malignant neoplasm of breast    9. Colon cancer screening        Procedure   Procedures       Patient Self-Management and Personalized Health Advice  The patient has been provided with information about: diet, exercise, weight management and designing advance directives and preventive services including:   · Annual Wellness Visit (AWV)  · Diabetes Screening-Lab Order for either glucose quantitative blood (except reagent strip), glucose;post glucose dose(includes glucose), or glucose tolerance test-3 specimens(includes glucose).    Diagnoses and all orders for this visit:    1. Medicare annual wellness visit, subsequent (Primary)    2. Preventative health care    3. Hypertension, unspecified type  -     Comprehensive Metabolic Panel; Future  -     CBC & Differential; Future  -     Lipid Panel; Future  -     TSH Rfx On Abnormal To Free T4; Future  -     Urinalysis With Microscopic If Indicated (No Culture) - Urine, Clean Catch; Future  -     Vitamin D 25 Hydroxy; Future    4. Cerebrovascular accident (CVA), unspecified mechanism (CMS/HCC)  -     Comprehensive Metabolic Panel; Future  -     CBC & Differential; Future  -     Lipid Panel; Future  -     TSH Rfx On Abnormal To Free T4; Future  -     Urinalysis With  Microscopic If Indicated (No Culture) - Urine, Clean Catch; Future  -     Vitamin D 25 Hydroxy; Future    5. Vitamin D deficiency  -     Comprehensive Metabolic Panel; Future  -     CBC & Differential; Future  -     Lipid Panel; Future  -     TSH Rfx On Abnormal To Free T4; Future  -     Urinalysis With Microscopic If Indicated (No Culture) - Urine, Clean Catch; Future  -     Vitamin D 25 Hydroxy; Future    6. Hyperlipidemia, unspecified hyperlipidemia type  -     Comprehensive Metabolic Panel; Future  -     CBC & Differential; Future  -     Lipid Panel; Future  -     TSH Rfx On Abnormal To Free T4; Future  -     Urinalysis With Microscopic If Indicated (No Culture) - Urine, Clean Catch; Future  -     Vitamin D 25 Hydroxy; Future    7. Chronic renal failure, stage 5 (CMS/HCC)  -     Comprehensive Metabolic Panel; Future  -     CBC & Differential; Future  -     Lipid Panel; Future  -     TSH Rfx On Abnormal To Free T4; Future  -     Urinalysis With Microscopic If Indicated (No Culture) - Urine, Clean Catch; Future  -     Vitamin D 25 Hydroxy; Future    8. Encounter for screening mammogram for malignant neoplasm of breast    9. Colon cancer screening        Problem List Items Addressed This Visit        Cardiovascular and Mediastinum    Stroke (CMS/HCC)    Relevant Orders    Comprehensive Metabolic Panel (Completed)    CBC & Differential (Completed)    Lipid Panel (Completed)    TSH Rfx On Abnormal To Free T4 (Completed)    Urinalysis With Microscopic If Indicated (No Culture) - Urine, Clean Catch (Completed)    Vitamin D 25 Hydroxy (Completed)    Hypertension    Relevant Orders    Comprehensive Metabolic Panel (Completed)    CBC & Differential (Completed)    Lipid Panel (Completed)    TSH Rfx On Abnormal To Free T4 (Completed)    Urinalysis With Microscopic If Indicated (No Culture) - Urine, Clean Catch (Completed)    Vitamin D 25 Hydroxy (Completed)    Hyperlipidemia    Relevant Orders    Comprehensive Metabolic  Panel (Completed)    CBC & Differential (Completed)    Lipid Panel (Completed)    TSH Rfx On Abnormal To Free T4 (Completed)    Urinalysis With Microscopic If Indicated (No Culture) - Urine, Clean Catch (Completed)    Vitamin D 25 Hydroxy (Completed)       Digestive    Vitamin D deficiency    Relevant Orders    Comprehensive Metabolic Panel (Completed)    CBC & Differential (Completed)    Lipid Panel (Completed)    TSH Rfx On Abnormal To Free T4 (Completed)    Urinalysis With Microscopic If Indicated (No Culture) - Urine, Clean Catch (Completed)    Vitamin D 25 Hydroxy (Completed)       Genitourinary    Chronic renal failure, stage 5 (CMS/HCC)    Relevant Orders    Comprehensive Metabolic Panel (Completed)    CBC & Differential (Completed)    Lipid Panel (Completed)    TSH Rfx On Abnormal To Free T4 (Completed)    Urinalysis With Microscopic If Indicated (No Culture) - Urine, Clean Catch (Completed)    Vitamin D 25 Hydroxy (Completed)      Other Visit Diagnoses     Medicare annual wellness visit, subsequent    -  Primary    Preventative health care        Encounter for screening mammogram for malignant neoplasm of breast        Colon cancer screening              Patient Instructions   1.  Have mammogram and colonoscopy completed over the next 1 year    2.  Continue with kidney specialist, discuss dialysis      The wellness exam has been reviewed in detail.  The patient has been fully counseled on preventative guidelines for vaccines, cancer screenings, and other health maintenance needs.  Functional testing has been performed to assess capacity for independent living and need for other medical interventions.  Screening for depression was completed via a validated screening model as indicated above, 15 minutes was spent in total on depression screening.  The patient was counseled on maintaining a lifestyle to promote good health and to minimize chronic diseases, including 15 minutes spent screening for alcohol  misuse and counseling on safe and appropriate alcohol intake and overall risk reduction as indicated above.  The patient has been assisted with scheduling healthcare procedures for the coming year and given a written document outlining these recommendations.    Follow Up:  Return in about 1 year (around 12/15/2021) for Annual.     An After Visit Summary and PPPS with all of these plans were given to the patient.

## 2020-12-15 NOTE — PATIENT INSTRUCTIONS
1.  Have mammogram and colonoscopy completed over the next 1 year    2.  Continue with kidney specialist, discuss dialysis

## 2021-01-01 ENCOUNTER — OFFICE VISIT (OUTPATIENT)
Dept: FAMILY MEDICINE CLINIC | Facility: CLINIC | Age: 51
End: 2021-01-01

## 2021-01-01 VITALS
OXYGEN SATURATION: 97 % | RESPIRATION RATE: 16 BRPM | SYSTOLIC BLOOD PRESSURE: 130 MMHG | WEIGHT: 162.6 LBS | HEART RATE: 64 BPM | DIASTOLIC BLOOD PRESSURE: 80 MMHG | BODY MASS INDEX: 23.28 KG/M2 | HEIGHT: 70 IN

## 2021-01-01 DIAGNOSIS — I63.9 CEREBROVASCULAR ACCIDENT (CVA), UNSPECIFIED MECHANISM (HCC): ICD-10-CM

## 2021-01-01 DIAGNOSIS — Z00.00 MEDICARE ANNUAL WELLNESS VISIT, SUBSEQUENT: Primary | ICD-10-CM

## 2021-01-01 DIAGNOSIS — Z12.31 ENCOUNTER FOR SCREENING MAMMOGRAM FOR BREAST CANCER: ICD-10-CM

## 2021-01-01 DIAGNOSIS — Z23 NEED FOR INFLUENZA VACCINATION: ICD-10-CM

## 2021-01-01 DIAGNOSIS — Z01.419 WELL WOMAN EXAM: ICD-10-CM

## 2021-01-01 DIAGNOSIS — E55.9 VITAMIN D DEFICIENCY: ICD-10-CM

## 2021-01-01 DIAGNOSIS — Z12.11 ENCOUNTER FOR SCREENING FOR MALIGNANT NEOPLASM OF COLON: ICD-10-CM

## 2021-01-01 PROCEDURE — 90686 IIV4 VACC NO PRSV 0.5 ML IM: CPT | Performed by: FAMILY MEDICINE

## 2021-01-01 PROCEDURE — 1126F AMNT PAIN NOTED NONE PRSNT: CPT | Performed by: FAMILY MEDICINE

## 2021-01-01 PROCEDURE — G0008 ADMIN INFLUENZA VIRUS VAC: HCPCS | Performed by: FAMILY MEDICINE

## 2021-01-01 PROCEDURE — 1170F FXNL STATUS ASSESSED: CPT | Performed by: FAMILY MEDICINE

## 2021-01-01 PROCEDURE — 1160F RVW MEDS BY RX/DR IN RCRD: CPT | Performed by: FAMILY MEDICINE

## 2021-01-01 PROCEDURE — G0439 PPPS, SUBSEQ VISIT: HCPCS | Performed by: FAMILY MEDICINE

## 2021-01-01 RX ORDER — FLUOXETINE HYDROCHLORIDE 20 MG/1
20 CAPSULE ORAL DAILY
Qty: 90 CAPSULE | Refills: 0 | Status: SHIPPED | OUTPATIENT
Start: 2021-01-01 | End: 2021-01-01 | Stop reason: SDUPTHER

## 2021-01-01 RX ORDER — FLUOXETINE HYDROCHLORIDE 20 MG/1
20 CAPSULE ORAL DAILY
Qty: 90 CAPSULE | Refills: 1 | Status: SHIPPED | OUTPATIENT
Start: 2021-01-01

## 2021-01-01 RX ORDER — TERAZOSIN 5 MG/1
5 CAPSULE ORAL NIGHTLY
Qty: 90 CAPSULE | Refills: 0 | Status: SHIPPED | OUTPATIENT
Start: 2021-01-01 | End: 2021-01-01 | Stop reason: SDUPTHER

## 2021-01-01 RX ORDER — ATORVASTATIN CALCIUM 40 MG/1
40 TABLET, FILM COATED ORAL DAILY
Qty: 90 TABLET | Refills: 1 | Status: SHIPPED | OUTPATIENT
Start: 2021-01-01 | End: 2021-01-01 | Stop reason: SDUPTHER

## 2021-01-01 RX ORDER — FLUOXETINE HYDROCHLORIDE 20 MG/1
20 CAPSULE ORAL DAILY
Qty: 90 CAPSULE | Refills: 0 | Status: SHIPPED | OUTPATIENT
Start: 2021-01-01 | End: 2021-01-01

## 2021-01-01 RX ORDER — CALCITRIOL 0.25 UG/1
0.25 CAPSULE, LIQUID FILLED ORAL EVERY OTHER DAY
Qty: 45 CAPSULE | Refills: 2 | Status: SHIPPED | OUTPATIENT
Start: 2021-01-01

## 2021-01-01 RX ORDER — ISOSORBIDE DINITRATE 10 MG/1
30 TABLET ORAL 3 TIMES DAILY
Qty: 810 TABLET | Refills: 0 | Status: SHIPPED | OUTPATIENT
Start: 2021-01-01 | End: 2021-01-01

## 2021-01-01 RX ORDER — ISOSORBIDE DINITRATE 10 MG/1
TABLET ORAL
Qty: 810 TABLET | Refills: 0 | Status: SHIPPED | OUTPATIENT
Start: 2021-01-01 | End: 2021-01-01

## 2021-01-01 RX ORDER — CALCITRIOL 0.25 UG/1
0.25 CAPSULE, LIQUID FILLED ORAL EVERY OTHER DAY
Qty: 45 CAPSULE | Refills: 2 | Status: SHIPPED | OUTPATIENT
Start: 2021-01-01 | End: 2021-01-01 | Stop reason: SDUPTHER

## 2021-01-01 RX ORDER — ISOSORBIDE DINITRATE 10 MG/1
TABLET ORAL
Qty: 810 TABLET | Refills: 0 | Status: SHIPPED | OUTPATIENT
Start: 2021-01-01

## 2021-01-01 RX ORDER — ASPIRIN 81 MG/1
TABLET, COATED ORAL
Qty: 90 TABLET | Refills: 3 | Status: SHIPPED | OUTPATIENT
Start: 2021-01-01

## 2021-01-01 RX ORDER — TERAZOSIN 5 MG/1
5 CAPSULE ORAL NIGHTLY
Qty: 90 CAPSULE | Refills: 0 | Status: SHIPPED | OUTPATIENT
Start: 2021-01-01 | End: 2021-01-01

## 2021-01-01 RX ORDER — EPOETIN ALFA-EPBX 4000 [IU]/ML
2 INJECTION, SOLUTION INTRAVENOUS; SUBCUTANEOUS
COMMUNITY
Start: 2021-01-01 | End: 2021-01-01

## 2021-01-01 RX ORDER — SODIUM BICARBONATE 650 MG/1
650 TABLET ORAL 3 TIMES DAILY
Qty: 270 TABLET | Refills: 0 | Status: SHIPPED | OUTPATIENT
Start: 2021-01-01

## 2021-01-01 RX ORDER — TERAZOSIN 5 MG/1
CAPSULE ORAL
Qty: 90 CAPSULE | Refills: 1 | Status: SHIPPED | OUTPATIENT
Start: 2021-01-01

## 2021-01-01 RX ORDER — ATORVASTATIN CALCIUM 40 MG/1
40 TABLET, FILM COATED ORAL DAILY
Qty: 90 TABLET | Refills: 1 | Status: SHIPPED | OUTPATIENT
Start: 2021-01-01 | End: 2022-01-01

## 2021-01-01 RX ORDER — ISOSORBIDE DINITRATE 10 MG/1
TABLET ORAL
Qty: 810 TABLET | Refills: 0 | Status: SHIPPED | OUTPATIENT
Start: 2021-01-01 | End: 2021-01-01 | Stop reason: SDUPTHER

## 2021-01-05 RX ORDER — ATORVASTATIN CALCIUM 40 MG/1
40 TABLET, FILM COATED ORAL DAILY
Qty: 90 TABLET | Refills: 1 | Status: SHIPPED | OUTPATIENT
Start: 2021-01-05 | End: 2021-01-01

## 2021-07-01 NOTE — TELEPHONE ENCOUNTER
LOV 12/15/2020  NOV 12/21/2021  Refilled   Calcitriol 10/14/2020  Isordil 04/01/2021  Lipitor 01/05/2021

## 2021-08-26 NOTE — TELEPHONE ENCOUNTER
Rx Refill Note  Requested Prescriptions     Pending Prescriptions Disp Refills   • terazosin (HYTRIN) 5 MG capsule [Pharmacy Med Name: TERAZOSIN 5MG CAPSULES] 90 capsule 0     Sig: TAKE 1 CAPSULE BY MOUTH EVERY NIGHT   • FLUoxetine (PROzac) 20 MG capsule [Pharmacy Med Name: FLUOXETINE 20MG CAPSULES] 90 capsule 0     Sig: TAKE 1 CAPSULE BY MOUTH DAILY      Last office visit with prescribing clinician: 12/15/2020      Next office visit with prescribing clinician: 12/21/2021       {TIP  Please add Last Relevant Lab Date if appropriate:  12/15/2020    Janet Preciado MA  08/26/21, 13:37 EDT

## 2021-09-03 NOTE — TELEPHONE ENCOUNTER
Rx Refill Note  Requested Prescriptions     Pending Prescriptions Disp Refills   • terazosin (HYTRIN) 5 MG capsule 90 capsule 0     Sig: Take 1 capsule by mouth Every Night.   • isosorbide dinitrate (ISORDIL) 10 MG tablet 810 tablet 0     Sig: Take 3 tablets by mouth 3 (Three) Times a Day.   • FLUoxetine (PROzac) 20 MG capsule 90 capsule 0     Sig: Take 1 capsule by mouth Daily.   • calcitriol (ROCALTROL) 0.25 MCG capsule 45 capsule 2     Sig: Take 1 capsule by mouth Every Other Day.   • atorvastatin (LIPITOR) 40 MG tablet 90 tablet 1     Sig: Take 1 tablet by mouth Daily.      Last office visit with prescribing clinician: 12/15/2020      Next office visit with prescribing clinician: 12/21/2021            Mandy Mendoza MA  09/03/21, 14:07 EDT     Last labs 12/15/2020

## 2021-09-29 NOTE — TELEPHONE ENCOUNTER
Rx Refill Note  Requested Prescriptions     Pending Prescriptions Disp Refills   • isosorbide dinitrate (ISORDIL) 10 MG tablet [Pharmacy Med Name: ISOSORBIDE DINITRATE 10MG ORAL TABS] 810 tablet 0     Sig: TAKE 3 TABLETS BY MOUTH THREE TIMES DAILY   • Aspirin Low Dose 81 MG EC tablet [Pharmacy Med Name: ASPIRIN 81MG EC LOW DOSE  TABLETS] 90 tablet 3     Sig: TAKE 1 TABLET BY MOUTH EVERY DAY      Last office visit with prescribing clinician: 12/15/2020      Next office visit with prescribing clinician: 12/21/2021            Génesis Hinojosa MA  09/29/21, 15:25 EDT

## 2021-10-30 NOTE — TELEPHONE ENCOUNTER
Rx Refill Note  Requested Prescriptions     Pending Prescriptions Disp Refills   • terazosin (HYTRIN) 5 MG capsule [Pharmacy Med Name: TERAZOSIN  5MG  CAP] 90 capsule 3     Sig: TAKE 1 CAPSULE BY MOUTH AT  NIGHT   • FLUoxetine (PROzac) 20 MG capsule [Pharmacy Med Name: FLUoxetine HCl 20 MG Oral Capsule] 90 capsule 3     Sig: TAKE 1 CAPSULE BY MOUTH  DAILY      Last office visit with prescribing clinician: 12/15/2020      Next office visit with prescribing clinician: 12/21/2021            Mandy Mendoza MA  10/30/21, 10:57 EDT

## 2021-12-27 NOTE — PROGRESS NOTES
"  The ABCs of the Annual Wellness Visit  Subsequent Medicare Wellness Visit    Chief Complaint   Patient presents with   • Medicare Wellness-subsequent   • Gyn and Mamogram referral      Subjective    History of Present Illness:  Cindy Moreno is a 51 y.o. female who presents for a Subsequent Medicare Wellness Visit.    The patient saw the nephrologist approximately 2  weeks ago. She states she was placed on an oral tablet in which she also has to give herself an injection every 2 weeks. She reports her daughter gives her the injection. She adds she may get a kidney transplant.    She reports that compared to last year she is not doing much and lays in the bed more often. Additionally, she states that her energy is low. She reports that her blood is at 10 percent and adds Dr. Bernard, from the [facility], is going to wait until she feels \"something.\"She denies the use of a cane to assist with ambulation, even when it is icy outside.    She reports that she had both COVID-19 vaccinations. She notes that she has not received her last COVID-19 booster dose. She denies having an allergic reaction to the influenza vaccination. The patient adds she would like the influenza vaccination today and will come back for the COVID-19 booster dose.    She denies ever having an abnormal Pap smear. Additionally, she requests a referral to gynecology for a mammogram.    She reports that she is still taking the fluoxetine. The patient adds she is taking 10 different medications. She reports that Dr. Bernard is handling her blood pressure medications. She denies needing refills at this time.    The following portions of the patient's history were reviewed and   updated as appropriate: allergies, current medications, past family history, past medical history, past social history, past surgical history and problem list.    Compared to one year ago, the patient feels her physical   health is worse.    Compared " to one year ago, the patient feels her mental   health is the same.    Recent Hospitalizations:  She was not admitted to the hospital during the last year.       Current Medical Providers:  Patient Care Team:  To Bennett DO as PCP - General (Family Medicine)  Shayy Fernandez MD (Physical Medicine and Rehabilitation)  Aneudy Bernard (Nurse Practitioner)    Outpatient Medications Prior to Visit   Medication Sig Dispense Refill   • amLODIPine (NORVASC) 10 MG tablet Take 1 tablet by mouth Daily.     • Aspirin Low Dose 81 MG EC tablet TAKE 1 TABLET BY MOUTH EVERY DAY 90 tablet 3   • atorvastatin (LIPITOR) 40 MG tablet Take 1 tablet by mouth Daily. 90 tablet 1   • calcitriol (ROCALTROL) 0.25 MCG capsule Take 1 capsule by mouth Every Other Day. 45 capsule 2   • carvedilol (COREG) 25 MG tablet Take 25 mg by mouth 2 (Two) Times a Day.     • epoetin aditay-epbx (Retacrit) 4000 UNIT/ML injection Inject 2 mL under the skin into the appropriate area as directed Every 14 (Fourteen) Days.     • FLUoxetine (PROzac) 20 MG capsule TAKE 1 CAPSULE BY MOUTH  DAILY 90 capsule 1   • isosorbide dinitrate (ISORDIL) 10 MG tablet TAKE 3 TABLETS BY MOUTH THREE TIMES DAILY 810 tablet 0   • labetalol (NORMODYNE) 200 MG tablet TAKE 1 TABLET BY MOUTH EVERY 8 HOURS 270 tablet 0   • NIFEdipine XL (PROCARDIA XL) 90 MG 24 hr tablet TAKE 1 TABLET BY MOUTH DAILY 90 tablet 0   • Patiromer Sorbitex Calcium 8.4 g pack Take  by mouth.     • sodium bicarbonate 650 MG tablet Take 1 tablet by mouth 3 (Three) Times a Day. 270 tablet 0   • terazosin (HYTRIN) 5 MG capsule TAKE 1 CAPSULE BY MOUTH AT  NIGHT 90 capsule 1     No facility-administered medications prior to visit.       No opioid medication identified on active medication list. I have reviewed chart for other potential  high risk medication/s and harmful drug interactions in the elderly.          Aspirin is on active medication list. Aspirin use is indicated based on review of current  "medical condition/s. Pros and cons of this therapy have been discussed today. Benefits of this medication outweigh potential harm.  Patient has been encouraged to continue taking this medication.  .      Patient Active Problem List   Diagnosis   • Stroke (HCC)   • Hypertension   • Vitamin D deficiency   • Hyperlipidemia   • Microcytic anemia   • Chronic renal failure, stage 5 (HCC)   • Aphasia due to acute cerebrovascular accident (CVA) (HCC)     Advance Care Planning  Advance Directive is not on file.  ACP discussion was held with the patient during this visit. Patient does not have an advance directive, information provided.          Objective    Vitals:    21 1027   BP: 130/80   Pulse: 64   Resp: 16   SpO2: 97%   Weight: 73.8 kg (162 lb 9.6 oz)   Height: 177.8 cm (70\")   PainSc: 0-No pain     BMI Readings from Last 1 Encounters:   21 23.33 kg/m²   BMI is within normal parameters. No follow-up required.  Body mass index is 23.33 kg/m².  BMI has not been calculated during today's encounter.     Does the patient have evidence of cognitive impairment? Yes   ATTENTION  What is the year: correct  What is the month of the year: correct  What is the day of the week?: correct  What is the date?: correct  MEMORY  Repeat address three times, only score third attempt: [address] [place], [place]: 7  HOW MANY ANIMALS DID THE PATIENT NAME  Verbal Fluency -- Animal Names (0-25): 5-6  CLOCK DRAWING  Clock Drawing: All Correct  MEMORY RECALL  Tell me what you remember about that name and address we were repeating at the beginnin  ACE TOTAL SCORE  Total ACE Score - <25/30 strongly suggests cognitive impairment; <21/30 almost certainly shows dementia: 17      Physical Exam            HEALTH RISK ASSESSMENT    Smoking Status:  Social History     Tobacco Use   Smoking Status Never Smoker   Smokeless Tobacco Never Used     Alcohol Consumption:  Social History     Substance and Sexual Activity "   Alcohol Use Yes    Comment: occasional     Fall Risk Screen:    ELIUD Fall Risk Assessment has not been completed.    Depression Screening:  PHQ-2/PHQ-9 Depression Screening 12/27/2021   Little interest or pleasure in doing things 0   Feeling down, depressed, or hopeless 0   Total Score 0       Health Habits and Functional and Cognitive Screening:  Functional & Cognitive Status 12/27/2021   Do you have difficulty preparing food and eating? No   Do you have difficulty bathing yourself, getting dressed or grooming yourself? No   Do you have difficulty using the toilet? No   Do you have difficulty moving around from place to place? No   Do you have trouble with steps or getting out of a bed or a chair? No   Current Diet Limited Junk Food   Dental Exam Up to date   Eye Exam Not up to date   Exercise (times per week) 0 times per week   Current Exercises Include No Regular Exercise   Do you need help using the phone?  No   Are you deaf or do you have serious difficulty hearing?  No   Do you need help with transportation? No   Do you need help shopping? No   Do you need help preparing meals?  No   Do you need help with housework?  No   Do you need help with laundry? No   Do you need help taking your medications? No   Do you need help managing money? No   Do you ever drive or ride in a car without wearing a seat belt? No   Have you felt unusual stress, anger or loneliness in the last month? No   Who do you live with? Other   If you need help, do you have trouble finding someone available to you? No   Have you been bothered in the last four weeks by sexual problems? No   Do you have difficulty concentrating, remembering or making decisions? No       Age-appropriate Screening Schedule:  Refer to the list below for future screening recommendations based on patient's age, sex and/or medical conditions. Orders for these recommended tests are listed in the plan section. The patient has been provided with a written  plan.    Health Maintenance   Topic Date Due   • MAMMOGRAM  Never done   • PAP SMEAR  05/16/2019   • ZOSTER VACCINE (1 of 2) Never done   • LIPID PANEL  12/15/2021   • TDAP/TD VACCINES (2 - Td or Tdap) 04/18/2026   • INFLUENZA VACCINE  Completed              Assessment/Plan   CMS Preventative Services Quick Reference  Risk Factors Identified During Encounter  Cardiovascular Disease  Dementia/Memory   Fall Risk-High or Moderate  The above risks/problems have been discussed with the patient.  Follow up actions/plans if indicated are seen below in the Assessment/Plan Section.  Pertinent information has been shared with the patient in the After Visit Summary.    Diagnoses and all orders for this visit:    1. Medicare annual wellness visit, subsequent (Primary)     2. Encounter for screening mammogram for breast cancer  -     Mammo Screening, Bilateral with CAD (Annually); Future    3. Encounter for screening for malignant neoplasm of colon  -     Ambulatory Referral For Screening Colonoscopy (Gastroenterology)    1. Medicare annual wellness visit, subsequent  - She is doing well overall.  - She received her influenza vaccine today.  - She will return to the clinic in 02/2022.    2. Essential hypertension  - She will continue on her current medications.    3. Anemia, unspecified type  - She will continue on her current medication.    4. Chronic kidney disease, stage 3 (moderate) (HCC)  - She will continue to follow up with nephrology.    5. Hyperlipidemia, unspecified hyperlipidemia type  - She will continue taking her current medication.    6. Depression, unspecified depression type  - She will continue taking fluoxetine.    She will return to the clinic in 02/2022 for a follow-up.    Follow Up:   Return in about 6 months (around 6/27/2022).     An After Visit Summary and PPPS were made available to the patient.           Transcribed from ambient dictation for To Bennett,  by Florentino Helm.  12/27/21   16:23  EST    Patient verbalized consent to the visit recording.  I have personally performed the services described in this document as transcribed by the above individual, and it is both accurate and complete.  To Bennett DO  12/27/2021  16:40 EST

## 2021-12-27 NOTE — PATIENT INSTRUCTIONS
Advance Care Planning and Advance Directives     You make decisions on a daily basis - decisions about where you want to live, your career, your home, your life. Perhaps one of the most important decisions you face is your choice for future medical care. Take time to talk with your family and your healthcare team and start planning today.  Advance Care Planning is a process that can help you:  · Understand possible future healthcare decisions in light of your own experiences  · Reflect on those decision in light of your goals and values  · Discuss your decisions with those closest to you and the healthcare professionals that care for you  · Make a plan by creating a document that reflects your wishes    Surrogate Decision Maker  In the event of a medical emergency, which has left you unable to communicate or to make your own decisions, you would need someone to make decisions for you.  It is important to discuss your preferences for medical treatment with this person while you are in good health.     Qualities of a surrogate decision maker:  • Willing to take on this role and responsibility  • Knows what you want for future medical care  • Willing to follow your wishes even if they don't agree with them  • Able to make difficult medical decisions under stressful circumstances    Advance Directives  These are legal documents you can create that will guide your healthcare team and decision maker(s) when needed. These documents can be stored in the electronic medical record.    · Living Will - a legal document to guide your care if you have a terminal condition or a serious illness and are unable to communicate. States vary by statute in document names/types, but most forms may include one or more of the following:        -  Directions regarding life-prolonging treatments        -  Directions regarding artificially provided nutrition/hydration        -  Choosing a healthcare decision maker        -  Direction  regarding organ/tissue donation    · Durable Power of  for Healthcare - this document names an -in-fact to make medical decisions for you, but it may also allow this person to make personal and financial decisions for you. Please seek the advice of an  if you need this type of document.    **Advance Directives are not required and no one may discriminate against you if you do not sign one.    Medical Orders  Many states allow specific forms/orders signed by your physician to record your wishes for medical treatment in your current state of health. This form, signed in personal communication with your physician, addresses resuscitation and other medical interventions that you may or may not want.      For more information or to schedule a time with a [facility] Advance Care Planning Facilitator contact: [facility] or call [phone] and someone will contact you directly.  You are due for Shingrix vaccination series ( the newest shingles vaccine).  It is a two shot series spaced 2-6 months apart. Please get this vaccine series started at your earliest convenience at your local pharmacy to help avoid shingles outbreak. It is more effective than the old Zostavax vaccine and is recommended even if you have had the Zostavax vaccine in the past.  Once the Shingrix series is completed, it does not need to be repeated.   For more information, please look at the website below:  [facility] Shingrix Vaccine Information      Medicare Wellness  Personal Prevention Plan of Service     Date of Office Visit:    Encounter Provider:  To Bennett DO  Place of Service:  [facility]  Patient Name: Cindy Moreno  :  1970    As part of the Medicare Wellness portion of your visit today, we are providing you with this personalized preventive plan of services (PPPS). This plan is based upon recommendations of the [place] Preventive Services Task Force  (USPSTF) and the Advisory Committee on Immunization Practices (ACIP).    This lists the preventive care services that should be considered, and provides dates of when you are due. Items listed as completed are up-to-date and do not require any further intervention.    Health Maintenance   Topic Date Due   • MAMMOGRAM  Never done   • COLORECTAL CANCER SCREENING  Never done   • HEPATITIS C SCREENING  Never done   • PAP SMEAR  05/16/2019   • ZOSTER VACCINE (1 of 2) Never done   • LIPID PANEL  12/15/2021   • COVID-19 Vaccine (3 - Booster for Moderna series) 12/29/2021 (Originally 12/2/2021)   • ANNUAL WELLNESS VISIT  12/27/2022   • TDAP/TD VACCINES (2 - Td or Tdap) 04/18/2026   • INFLUENZA VACCINE  Completed   • Pneumococcal Vaccine 0-64  Aged Out       Orders Placed This Encounter   Procedures   • Mammo Screening, Bilateral with CAD (Annually)     Use HCPCS/CPT Code 93371     Standing Status:   Future     Standing Expiration Date:   12/27/2022     Order Specific Question:   Reason for Exam:     Answer:   screen for breast cancer     Order Specific Question:   Release to patient     Answer:   Immediate   • FluLaval/Fluarix/Fluzone >6 Months ([id number])   • Ambulatory Referral For Screening Colonoscopy (Gastroenterology)     Referral Priority:   Routine     Referral Type:   Diagnostic Medical     Referral Reason:   Specialty Services Required     Number of Visits Requested:   1   • Ambulatory Referral to Gynecology     Referral Priority:   Routine     Referral Type:   Consultation     Referral Reason:   Specialty Services Required     Requested Specialty:   Gynecology     Number of Visits Requested:   1       No follow-ups on file.

## 2022-01-01 ENCOUNTER — PREP FOR SURGERY (OUTPATIENT)
Dept: OTHER | Facility: HOSPITAL | Age: 52
End: 2022-01-01

## 2022-01-01 ENCOUNTER — HOSPITAL ENCOUNTER (OUTPATIENT)
Facility: HOSPITAL | Age: 52
Setting detail: HOSPITAL OUTPATIENT SURGERY
End: 2022-01-01
Attending: INTERNAL MEDICINE | Admitting: INTERNAL MEDICINE

## 2022-01-01 DIAGNOSIS — Z12.11 SPECIAL SCREENING FOR MALIGNANT NEOPLASMS, COLON: Primary | ICD-10-CM

## 2022-01-01 RX ORDER — ATORVASTATIN CALCIUM 40 MG/1
40 TABLET, FILM COATED ORAL DAILY
Qty: 90 TABLET | Refills: 3 | Status: SHIPPED | OUTPATIENT
Start: 2022-01-01

## 2022-01-05 NOTE — TELEPHONE ENCOUNTER
Rx Refill Note  Requested Prescriptions     Pending Prescriptions Disp Refills   • atorvastatin (LIPITOR) 40 MG tablet [Pharmacy Med Name: Atorvastatin Calcium 40 MG Oral Tablet] 90 tablet 3     Sig: TAKE 1 TABLET BY MOUTH  DAILY      Last office visit with prescribing clinician: 12/27/2021      Next office visit with prescribing clinician: 6/27/2022   3}  Julianne Menard MA  01/05/22, 08:23 EST     Last fill: 09/03/2021    HMG-CoA Reductase Inhibitors (Statins) Protocol Failed 01/04/2022 11:38 PM   Protocol Details  Lipid panel in past 12 months    ALK Phos in past 12 months    ALT in past 12 months    AST in past 12 months

## 2022-01-26 PROBLEM — Z12.11 SPECIAL SCREENING FOR MALIGNANT NEOPLASMS, COLON: Status: ACTIVE | Noted: 2022-01-01

## 2022-01-31 RX ORDER — SODIUM, POTASSIUM,MAG SULFATES 17.5-3.13G
SOLUTION, RECONSTITUTED, ORAL ORAL
Qty: 354 ML | Refills: 0 | Status: SHIPPED | OUTPATIENT
Start: 2022-01-31

## 2022-02-07 ENCOUNTER — APPOINTMENT (OUTPATIENT)
Dept: PREADMISSION TESTING | Facility: HOSPITAL | Age: 52
End: 2022-02-07

## 2022-06-30 ENCOUNTER — TELEPHONE (OUTPATIENT)
Dept: FAMILY MEDICINE CLINIC | Facility: CLINIC | Age: 52
End: 2022-06-30

## 2022-06-30 NOTE — TELEPHONE ENCOUNTER
DELETE AFTER REVIEWING: Telephone encounter to be sent to the clinical pool     “Please be informed that patient has passed. Patient has been marked  in the system. The date of death is: 2022.    Caller: Mary Siddiqi    Relationship: Emergency Contact    Best call back number: [phone]

## 2022-08-24 NOTE — PROGRESS NOTES
Subjective   Cindy Moreno is a 49 y.o. female.     Chief Complaint   Patient presents with   • Hypertension       History of Present Illness     Cindy Moreno presents today for Follow-up from her CVA And for her hypertension. She overall feels she is doing well.  She has not returned to work yet, she had tried to return last year but was unable to tolerate it.  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    She is ambulating without the use of a cane or walker including outside the house.  She will see Dr. Bernard her nephrologist next Wednesday, he recommended previously that she call the dialysis people to talk more about peritoneal dialysis, keep track of her blood pressure daily, she evidently was not a candidate for peritoneal dialysis at .  - She takes amlodipine 10 mg for daily blood pressure control as well as bicarbonate 650 mg twice a day and calcitriol 0.25 µg 1 capsule Monday Wednesday Friday.  - She continues on labetalol 200 mg 3 times a day and isosorbide 30 mg every 6 hours  - He also recommended that she attend education class and consider peritoneal dialysis as part of dialysis prep, she has not done this as of yet  - Emphasized low-sodium diet and blood pressure control.    The following portions of the patient's history were reviewed and updated as appropriate: allergies, current medications, past family history, past medical history, past social history, past surgical history and problem list.    Active Ambulatory Problems     Diagnosis Date Noted   • Stroke (CMS/[facility]) 05/16/2018   • Hypertension 06/08/2018   • Vitamin D deficiency 07/09/2018   • Hyperlipidemia 08/09/2018   • Microcytic anemia 08/09/2018   • Chronic renal failure, stage 5 (CMS/[facility]) 09/06/2018   • Aphasia due to acute cerebrovascular accident (CVA) (CMS/[facility]) 01/21/2019     Resolved Ambulatory Problems     Diagnosis Date Noted   • No Resolved Ambulatory Problems     No Additional Past Medical History  "    Past Surgical History:   Procedure Laterality Date   • TONSILLECTOMY       Family History   Problem Relation Age of Onset   • No Known Problems Mother    • Heart attack Father    • No Known Problems Sister    • Heart disease Brother    • No Known Problems Daughter    • No Known Problems Son      Social History     Socioeconomic History   • Marital status: Unknown     Spouse name: Not on file   • Number of children: Not on file   • Years of education: Not on file   • Highest education level: Not on file   Tobacco Use   • Smoking status: Never Smoker   • Smokeless tobacco: Never Used   Substance and Sexual Activity   • Alcohol use: Yes     Comment: occasional   • Drug use: No         Review of Systems   Neurological: Positive for speech difficulty. Negative for seizures and syncope.       Objective   Blood pressure 140/80, pulse 64, temperature 97.8 °F (36.6 °C), height 177.8 cm (70\"), weight 73.5 kg (162 lb), SpO2 99 %, not currently breastfeeding.  Nursing note reviewed  Physical Exam  Const: NAD, A&Ox4, Pleasant, Cooperative  Eyes: EOMI, no conjunctivitis  ENT: No nasal discharge present, neck supple  Cardiac: Regular rate and rhythm, no peripheral edema or cyanosis  Resp: Respiratory rate within normal limits, no increased work of breathing, no audible wheezing or retractions noted  GI: No distention or ascites  MSK: Strength is 4/5 in the right hand.  This is slightly diminished from the 4+/5 otherwise in the right upper extremity.  There is swelling on the dorsum of the right hand.  It is currently splinted  Neurologic, CN II-XII grossly intact.  Her speech is much improved  Psych: Appropriate mood and behavior.  Skin: Pink, warm, dry  Procedures  Assessment/Plan   Cindy was seen today for hypertension.    Diagnoses and all orders for this visit:    Hypertension, unspecified type       #1 history of CVA  · She should continue low-dose aspirin in addition to atorvastatin 40 mg.  · She has some persistent " blurry vision, and should continue to follow with neuro-ophthalmology (most recent appt appt 9/20)  · Blood pressure control will be paramount to her health and recovery.  She will continue isosorbide, labetalol, and nifedipine XL 90 mg along with amlodipine  · She should continue outpatient speech therapy every 2 weeks at [facility], and follow-up with Dr. Cowart in PM&R as needed    #2 hypertension  · (Per renal) Continue amlodipine 10 mg for daily blood pressure control as well as bicarbonate 650 mg twice a day and calcitriol 0.25 µg 1 capsule Monday Wednesday Friday.  · Continue labetalol 200 mg 3 times a day and isosorbide 30 mg every 6 hours  · Goal is less than 120/80, she is pretty far from this currently. She should look into peritoneal dialysis    #3 chronic kidney disease secondary to uncontrolled long-standing hypertension  · Management per renal  · She should follow up over in February at     #4 normocytic anemia  · Also with some contribution from chronic kidney disease  · Hb 11.1 at last visit  · She should continue to follow with  nephrology for iron infusions as instructed  · We will check a CBC and ferritin today    #5 vitamin D deficiency  · Will defer to  nephrology with her chronic kidney disease    #6 depression  · Related to above declining health  · She should continue fluoxetine 20 mg.  We may consider increasing this in the future.    #7 fatigue  · Related to above declining health and CKD, as well as depression     Patient Instructions   1.  Blood pressure elevated today, discuss with kidney doctor    2.  When you return to work, would cap at 4 calls per hour for the first month or so.      Current Outpatient Medications:   •  amLODIPine (NORVASC) 10 MG tablet, Take 1 tablet by mouth Daily., Disp: , Rfl:   •  ASPIRIN LOW DOSE 81 MG EC tablet, TAKE 1 TABLET BY MOUTH DAILY, Disp: 90 tablet, Rfl: 1  •  atorvastatin (LIPITOR) 40 MG tablet, TAKE 1 TABLET BY MOUTH DAILY, Disp: 90  tablet, Rfl: 1  •  calcitriol (ROCALTROL) 0.25 MCG capsule, TAKE 1 CAPSULE BY MOUTH EVERY OTHER DAY, Disp: 45 capsule, Rfl: 2  •  carvedilol (COREG) 25 MG tablet, Take 25 mg by mouth 2 (Two) Times a Day., Disp: , Rfl:   •  docusate sodium (COLACE) 50 MG capsule, Take 1 capsule by mouth 2 (Two) Times a Day As Needed for Constipation., Disp: 60 capsule, Rfl: 2  •  FLUoxetine (PROzac) 20 MG capsule, TAKE 1 CAPSULE BY MOUTH DAILY, Disp: 90 capsule, Rfl: 0  •  isosorbide dinitrate (ISORDIL) 10 MG tablet, TAKE 3 TABLETS BY MOUTH THREE TIMES DAILY, Disp: 810 tablet, Rfl: 0  •  labetalol (NORMODYNE) 200 MG tablet, TAKE 1 TABLET BY MOUTH EVERY 8 HOURS, Disp: 270 tablet, Rfl: 0  •  NIFEdipine XL (PROCARDIA XL) 90 MG 24 hr tablet, TAKE 1 TABLET BY MOUTH DAILY, Disp: 90 tablet, Rfl: 0  •  Patiromer Sorbitex Calcium 8.4 g pack, Take  by mouth., Disp: , Rfl:   •  sodium bicarbonate 650 MG tablet, TAKE 1 TABLET BY MOUTH THREE TIMES DAILY, Disp: 270 tablet, Rfl: 0  •  STOOL SOFTENER 100 MG capsule, TK 1 C PO BID PRF CONSTIPATION, Disp: , Rfl: 2  •  terazosin (HYTRIN) 5 MG capsule, TAKE 1 CAPSULE BY MOUTH EVERY NIGHT, Disp: 90 capsule, Rfl: 0    No Known Allergies    Immunization History   Administered Date(s) Administered   • FLUARIX/FLUZONE/AFLURIA/FLULAVAL QUAD 11/22/2019       Ambulatory progress note signed and attested to by To Bennett D.O.              simple

## 2022-12-23 RX ORDER — ASPIRIN 81 MG/1
TABLET, COATED ORAL
Qty: 90 TABLET | Refills: 3 | OUTPATIENT
Start: 2022-12-23

## 2024-07-05 NOTE — TELEPHONE ENCOUNTER
LOV: 12/15/2020  NOV: 12/16/2021   Be able to participate in activities despite lingering anxiety/panic Be able to participate in activities despite lingering anxiety/panic Be able to participate in activities despite lingering anxiety/panic